# Patient Record
Sex: FEMALE
[De-identification: names, ages, dates, MRNs, and addresses within clinical notes are randomized per-mention and may not be internally consistent; named-entity substitution may affect disease eponyms.]

---

## 2022-11-19 ENCOUNTER — NURSE TRIAGE (OUTPATIENT)
Dept: OTHER | Facility: CLINIC | Age: 62
End: 2022-11-19

## 2022-11-19 NOTE — TELEPHONE ENCOUNTER
Location of patient: [place]    Subjective: Caller states \"I was wondering if I should go to the ER\"     Current Symptoms: Wears glasses for distance and can read fine without glasses. Noticed last night that her L eye is blurry now with and without her glasses. Does have headache today. High BP. BP at time of call is 160/95. Takes BP medication as prescribed. Last dose was 30-60 minutes ago. No chest pain or trouble breathing. Onset: 1 day ago; unchanged    Associated Symptoms: reduced activity    Pain Severity: 3-4/10; dull, aching; constant, moderate    Temperature: denies fever     LMP: NA Pregnant: NA    Recommended disposition: Go to ED Now    Care advice provided, patient verbalizes understanding; denies any other questions or concerns; instructed to call back for any new or worsening symptoms. Patient/caller agrees to proceed to [facility] Emergency Department    This triage is a result of a call to [address] [place] [address]. Please do not respond to the triage nurse through this encounter. Any subsequent communication should be directly with the patient.     Reason for Disposition   Systolic BP >= 203 OR Diastolic >= 467, and any cardiac or neurologic symptoms (e.g., chest pain, difficulty breathing, unsteady gait, blurred vision)    Protocols used: Blood Pressure - High-ADULT-[place]

## 2023-06-06 ENCOUNTER — TELEPHONE (OUTPATIENT)
Dept: PRIMARY CARE | Facility: CLINIC | Age: 63
End: 2023-06-06

## 2023-06-06 DIAGNOSIS — J45.40 MODERATE PERSISTENT ASTHMA WITHOUT COMPLICATION (HHS-HCC): Primary | ICD-10-CM

## 2023-06-06 RX ORDER — ROSUVASTATIN CALCIUM 20 MG/1
20 TABLET, COATED ORAL DAILY
COMMUNITY
End: 2023-06-27 | Stop reason: SDUPTHER

## 2023-06-06 RX ORDER — ALBUTEROL SULFATE 90 UG/1
AEROSOL, METERED RESPIRATORY (INHALATION) EVERY 6 HOURS PRN
COMMUNITY
Start: 2013-05-15 | End: 2023-06-06 | Stop reason: SDUPTHER

## 2023-06-06 RX ORDER — METOPROLOL TARTRATE AND HYDROCHLOROTHIAZIDE 100; 25 MG/1; MG/1
1 TABLET ORAL DAILY
COMMUNITY
End: 2023-06-27 | Stop reason: SDUPTHER

## 2023-06-06 RX ORDER — ALBUTEROL SULFATE 90 UG/1
2 AEROSOL, METERED RESPIRATORY (INHALATION) EVERY 6 HOURS PRN
Qty: 18 G | Refills: 1 | Status: SHIPPED | OUTPATIENT
Start: 2023-06-06

## 2023-06-06 NOTE — TELEPHONE ENCOUNTER
Pt called in for refills on Albuterol inhaler, Metoprolol-hydrochlorothiazide, and rosuvastatin. Pt has not had an apt since 1/2023. Apt was scheduled today for 6/20/23. Are you able to send in 1 month supply until she is able to come in? Please advise.

## 2023-06-20 ENCOUNTER — OFFICE VISIT (OUTPATIENT)
Dept: PRIMARY CARE | Facility: CLINIC | Age: 63
End: 2023-06-20
Payer: COMMERCIAL

## 2023-06-20 VITALS
WEIGHT: 281 LBS | OXYGEN SATURATION: 97 % | TEMPERATURE: 97.2 F | HEART RATE: 58 BPM | BODY MASS INDEX: 41.62 KG/M2 | HEIGHT: 69 IN | DIASTOLIC BLOOD PRESSURE: 79 MMHG | SYSTOLIC BLOOD PRESSURE: 124 MMHG

## 2023-06-20 DIAGNOSIS — E66.01 CLASS 3 SEVERE OBESITY WITH SERIOUS COMORBIDITY AND BODY MASS INDEX (BMI) OF 40.0 TO 44.9 IN ADULT, UNSPECIFIED OBESITY TYPE (MULTI): Primary | ICD-10-CM

## 2023-06-20 DIAGNOSIS — E03.9 ACQUIRED HYPOTHYROIDISM: ICD-10-CM

## 2023-06-20 DIAGNOSIS — J45.20 MILD INTERMITTENT ASTHMA WITHOUT COMPLICATION (HHS-HCC): ICD-10-CM

## 2023-06-20 DIAGNOSIS — E11.9 TYPE 2 DIABETES MELLITUS WITHOUT COMPLICATION, WITHOUT LONG-TERM CURRENT USE OF INSULIN (MULTI): Primary | ICD-10-CM

## 2023-06-20 DIAGNOSIS — I10 BENIGN ESSENTIAL HYPERTENSION: ICD-10-CM

## 2023-06-20 DIAGNOSIS — E78.2 HYPERLIPEMIA, MIXED: ICD-10-CM

## 2023-06-20 DIAGNOSIS — E66.01 CLASS 3 SEVERE OBESITY WITH SERIOUS COMORBIDITY AND BODY MASS INDEX (BMI) OF 40.0 TO 44.9 IN ADULT, UNSPECIFIED OBESITY TYPE (MULTI): ICD-10-CM

## 2023-06-20 PROBLEM — J38.7 IRRITABLE LARYNX SYNDROME: Status: RESOLVED | Noted: 2023-06-20 | Resolved: 2023-06-20

## 2023-06-20 PROBLEM — H52.03 HYPEROPIA WITH PRESBYOPIA OF BOTH EYES: Status: ACTIVE | Noted: 2023-06-20

## 2023-06-20 PROBLEM — J45.30 MILD PERSISTENT ASTHMA WITHOUT COMPLICATION (HHS-HCC): Status: ACTIVE | Noted: 2023-06-06

## 2023-06-20 PROBLEM — J30.9 ALLERGIC RHINITIS: Status: ACTIVE | Noted: 2023-06-20

## 2023-06-20 PROBLEM — H52.4 MYOPIA WITH ASTIGMATISM AND PRESBYOPIA: Status: ACTIVE | Noted: 2023-06-20

## 2023-06-20 PROBLEM — H04.123 DRY EYE SYNDROME OF BOTH LACRIMAL GLANDS: Status: ACTIVE | Noted: 2023-06-20

## 2023-06-20 PROBLEM — J34.2 DEVIATED NASAL SEPTUM: Status: RESOLVED | Noted: 2023-06-20 | Resolved: 2023-06-20

## 2023-06-20 PROBLEM — H52.4 HYPEROPIA WITH PRESBYOPIA OF BOTH EYES: Status: ACTIVE | Noted: 2023-06-20

## 2023-06-20 PROBLEM — K76.0 NAFLD (NONALCOHOLIC FATTY LIVER DISEASE): Status: ACTIVE | Noted: 2023-06-20

## 2023-06-20 PROBLEM — Z96.1 BILATERAL PSEUDOPHAKIA: Status: ACTIVE | Noted: 2023-06-20

## 2023-06-20 PROBLEM — H25.811 COMBINED FORM OF AGE-RELATED CATARACT, RIGHT EYE: Status: ACTIVE | Noted: 2023-06-20

## 2023-06-20 PROBLEM — D14.0 BENIGN NEOPLASM OF NASAL CAVITY: Status: RESOLVED | Noted: 2023-06-20 | Resolved: 2023-06-20

## 2023-06-20 PROBLEM — D14.0 BENIGN NEOPLASM OF NASAL CAVITY: Status: ACTIVE | Noted: 2023-06-20

## 2023-06-20 PROBLEM — E55.9 VITAMIN D DEFICIENCY: Status: ACTIVE | Noted: 2023-06-20

## 2023-06-20 PROBLEM — H52.209 MYOPIA WITH ASTIGMATISM AND PRESBYOPIA: Status: ACTIVE | Noted: 2023-06-20

## 2023-06-20 PROBLEM — H52.10 MYOPIA WITH ASTIGMATISM AND PRESBYOPIA: Status: ACTIVE | Noted: 2023-06-20

## 2023-06-20 PROBLEM — G43.909 MIGRAINE: Status: ACTIVE | Noted: 2023-06-20

## 2023-06-20 PROBLEM — H35.373 EPIRETINAL MEMBRANE (ERM) OF BOTH EYES: Status: ACTIVE | Noted: 2023-06-20

## 2023-06-20 PROBLEM — J38.7 IRRITABLE LARYNX SYNDROME: Status: ACTIVE | Noted: 2023-06-20

## 2023-06-20 PROBLEM — G47.33 OBSTRUCTIVE SLEEP APNEA: Status: ACTIVE | Noted: 2023-06-20

## 2023-06-20 PROBLEM — J34.2 DEVIATED NASAL SEPTUM: Status: ACTIVE | Noted: 2023-06-20

## 2023-06-20 PROBLEM — J32.8 OTHER CHRONIC SINUSITIS: Status: ACTIVE | Noted: 2023-06-20

## 2023-06-20 LAB — POC HEMOGLOBIN A1C: 6.5 % (ref 4.2–6.5)

## 2023-06-20 PROCEDURE — 99214 OFFICE O/P EST MOD 30 MIN: CPT | Performed by: FAMILY MEDICINE

## 2023-06-20 PROCEDURE — 3008F BODY MASS INDEX DOCD: CPT | Performed by: FAMILY MEDICINE

## 2023-06-20 PROCEDURE — 83036 HEMOGLOBIN GLYCOSYLATED A1C: CPT | Performed by: FAMILY MEDICINE

## 2023-06-20 PROCEDURE — 1036F TOBACCO NON-USER: CPT | Performed by: FAMILY MEDICINE

## 2023-06-20 PROCEDURE — 3078F DIAST BP <80 MM HG: CPT | Performed by: FAMILY MEDICINE

## 2023-06-20 PROCEDURE — 3074F SYST BP LT 130 MM HG: CPT | Performed by: FAMILY MEDICINE

## 2023-06-20 RX ORDER — AMLODIPINE AND BENAZEPRIL HYDROCHLORIDE 10; 40 MG/1; MG/1
1 CAPSULE ORAL DAILY
COMMUNITY
End: 2023-06-27 | Stop reason: SDUPTHER

## 2023-06-20 RX ORDER — ARTIFICIAL TEARS 1; 2; 3 MG/ML; MG/ML; MG/ML
SOLUTION/ DROPS OPHTHALMIC 4 TIMES DAILY PRN
COMMUNITY

## 2023-06-20 RX ORDER — THYROID, PORCINE 15 MG/1
TABLET ORAL
COMMUNITY
End: 2023-06-27 | Stop reason: SDUPTHER

## 2023-06-20 RX ORDER — IBUPROFEN 600 MG/1
600 TABLET ORAL EVERY 6 HOURS PRN
COMMUNITY
Start: 2023-03-20

## 2023-06-20 RX ORDER — TIRZEPATIDE 2.5 MG/.5ML
2.5 INJECTION, SOLUTION SUBCUTANEOUS
Qty: 6 ML | Refills: 0 | Status: SHIPPED | OUTPATIENT
Start: 2023-06-20 | End: 2023-08-24

## 2023-06-20 RX ORDER — ASPIRIN 81 MG/1
81 TABLET ORAL
COMMUNITY
Start: 2010-05-25

## 2023-06-20 RX ORDER — SUMATRIPTAN 50 MG/1
TABLET, FILM COATED ORAL
COMMUNITY
Start: 2016-08-09

## 2023-06-20 RX ORDER — ACETAMINOPHEN 500 MG
1 TABLET ORAL DAILY
COMMUNITY
Start: 2018-01-11

## 2023-06-20 ASSESSMENT — PATIENT HEALTH QUESTIONNAIRE - PHQ9
SUM OF ALL RESPONSES TO PHQ9 QUESTIONS 1 AND 2: 0
2. FEELING DOWN, DEPRESSED OR HOPELESS: NOT AT ALL
1. LITTLE INTEREST OR PLEASURE IN DOING THINGS: NOT AT ALL

## 2023-06-20 ASSESSMENT — ENCOUNTER SYMPTOMS
COUGH: 0
FATIGUE: 0
SHORTNESS OF BREATH: 0
UNEXPECTED WEIGHT CHANGE: 0
PALPITATIONS: 0
ABDOMINAL PAIN: 0

## 2023-06-20 NOTE — PROGRESS NOTES
"Subjective   Patient ID: Rebecca Acharya is a 62 y.o. female who presents for Follow-up (On BP and medication ).    Rebecca is here to follow up on her blood pressure and diabetes.  She is due for labs and refills.   No problems with her medication.  She is planning to retire in September.  She hasn't been doing well with her diet and hasn't been exercising regularly.  Her weight has been stable.          Review of Systems   Constitutional:  Negative for fatigue and unexpected weight change.   Respiratory:  Negative for cough and shortness of breath.    Cardiovascular:  Negative for chest pain and palpitations.   Gastrointestinal:  Negative for abdominal pain.       Objective     /79   Pulse 58   Temp 36.2 °C (97.2 °F)   Ht 1.74 m (5' 8.5\")   Wt 127 kg (281 lb)   SpO2 97%   BMI 42.10 kg/m²     Physical Exam  Constitutional:       General: She is not in acute distress.     Appearance: She is obese.   Neck:      Thyroid: No thyromegaly.   Cardiovascular:      Rate and Rhythm: Normal rate and regular rhythm.      Heart sounds: No murmur heard.  Pulmonary:      Effort: No respiratory distress.      Breath sounds: Normal breath sounds.   Lymphadenopathy:      Cervical: No cervical adenopathy.   Neurological:      Mental Status: She is alert.             Assessment/Plan   Problem List Items Addressed This Visit       Mild intermittent asthma without complication    Acquired hypothyroidism    Relevant Orders    TSH with reflex to Free T4 if abnormal    T4, free    T3, free    Benign essential hypertension    Current Assessment & Plan     Blood pressure is controlled.  Labs ordered.         Hyperlipemia, mixed    Relevant Orders    Comprehensive Metabolic Panel    Lipid Panel    Type 2 diabetes mellitus without complication, without long-term current use of insulin (CMS/[facility]) - Primary    Current Assessment & Plan     Blood sugar is controlled with diet alone.  Will see about coverage for Mounjaro to help her with her " weight.           Relevant Medications    tirzepatide (Mounjaro) 2.5 mg/0.5 mL pen injector    Other Relevant Orders    POCT glycosylated hemoglobin (Hb A1C) manually resulted (Completed)    Albumin , Urine Random     Other Visit Diagnoses       Class 3 severe obesity with serious comorbidity and body mass index (BMI) of 40.0 to 44.9 in adult, unspecified obesity type (CMS/HCC)        Relevant Medications    tirzepatide (Mounjaro) 2.5 mg/0.5 mL pen injector

## 2023-06-20 NOTE — ASSESSMENT & PLAN NOTE
Blood sugar is controlled with diet alone.  Will see about coverage for Mounjaro to help her with her weight.

## 2023-06-23 ENCOUNTER — TELEPHONE (OUTPATIENT)
Dept: PRIMARY CARE | Facility: CLINIC | Age: 63
End: 2023-06-23

## 2023-06-23 NOTE — TELEPHONE ENCOUNTER
Rx Refill Request Telephone Encounter    Name:  Rebecca Acharya  :  [id number]  Medication Name:  Metoprolol   Dose : 100-25  Route :    Frequency : 1 a day   Quantity : 90  Directions :    [facility] location:  [facility]  Date of last appointment:  25  Date of next appointment:    Best number to reach patient:  [phone]    Rx Refill Request Telephone Encounter    Name:  Rebecca Acharya  :  [id number]  Medication Name:  Rosuvastatin  Dose : 20 mg   Route :    Frequency : 1 a day  Quantity : 90  Directions :    [facility]  Date of last appointment:  23  Date of next appointment:    Best number to reach patient:

## 2023-06-27 ENCOUNTER — LAB (OUTPATIENT)
Dept: LAB | Facility: LAB | Age: 63
End: 2023-06-27
Payer: COMMERCIAL

## 2023-06-27 DIAGNOSIS — E11.9 TYPE 2 DIABETES MELLITUS WITHOUT COMPLICATION, WITHOUT LONG-TERM CURRENT USE OF INSULIN (MULTI): ICD-10-CM

## 2023-06-27 DIAGNOSIS — E78.2 HYPERLIPEMIA, MIXED: ICD-10-CM

## 2023-06-27 DIAGNOSIS — E03.9 ACQUIRED HYPOTHYROIDISM: ICD-10-CM

## 2023-06-27 LAB
ALANINE AMINOTRANSFERASE (SGPT) (U/L) IN SER/PLAS: 25 U/L (ref 7–45)
ALBUMIN (G/DL) IN SER/PLAS: 4.1 G/DL (ref 3.4–5)
ALBUMIN (MG/L) IN URINE: <7 MG/L
ALBUMIN/CREATININE (UG/MG) IN URINE: NORMAL UG/MG CRT (ref 0–30)
ALKALINE PHOSPHATASE (U/L) IN SER/PLAS: 93 U/L (ref 33–136)
ANION GAP IN SER/PLAS: 10 MMOL/L (ref 10–20)
ASPARTATE AMINOTRANSFERASE (SGOT) (U/L) IN SER/PLAS: 22 U/L (ref 9–39)
BILIRUBIN TOTAL (MG/DL) IN SER/PLAS: 0.4 MG/DL (ref 0–1.2)
CALCIUM (MG/DL) IN SER/PLAS: 9.4 MG/DL (ref 8.6–10.3)
CARBON DIOXIDE, TOTAL (MMOL/L) IN SER/PLAS: 32 MMOL/L (ref 21–32)
CHLORIDE (MMOL/L) IN SER/PLAS: 101 MMOL/L (ref 98–107)
CHOLESTEROL (MG/DL) IN SER/PLAS: 135 MG/DL (ref 0–199)
CHOLESTEROL IN HDL (MG/DL) IN SER/PLAS: 64.7 MG/DL
CHOLESTEROL/HDL RATIO: 2.1
CREATININE (MG/DL) IN SER/PLAS: 0.83 MG/DL (ref 0.5–1.05)
CREATININE (MG/DL) IN URINE: 100 MG/DL (ref 20–320)
GFR FEMALE: 78 ML/MIN/1.73M2
GLUCOSE (MG/DL) IN SER/PLAS: 104 MG/DL (ref 74–99)
LDL: 60 MG/DL (ref 0–99)
POTASSIUM (MMOL/L) IN SER/PLAS: 4.2 MMOL/L (ref 3.5–5.3)
PROTEIN TOTAL: 7.1 G/DL (ref 6.4–8.2)
SODIUM (MMOL/L) IN SER/PLAS: 139 MMOL/L (ref 136–145)
THYROTROPIN (MIU/L) IN SER/PLAS BY DETECTION LIMIT <= 0.05 MIU/L: 1.73 MIU/L (ref 0.44–3.98)
THYROXINE (T4) FREE (NG/DL) IN SER/PLAS: 0.69 NG/DL (ref 0.61–1.12)
TRIGLYCERIDE (MG/DL) IN SER/PLAS: 52 MG/DL (ref 0–149)
TRIIODOTHYRONINE (T3) FREE (PG/ML) IN SER/PLAS: 2.9 PG/ML (ref 2.3–4.2)
UREA NITROGEN (MG/DL) IN SER/PLAS: 13 MG/DL (ref 6–23)
VLDL: 10 MG/DL (ref 0–40)

## 2023-06-27 PROCEDURE — 36415 COLL VENOUS BLD VENIPUNCTURE: CPT

## 2023-06-27 PROCEDURE — 84443 ASSAY THYROID STIM HORMONE: CPT

## 2023-06-27 PROCEDURE — 80061 LIPID PANEL: CPT

## 2023-06-27 PROCEDURE — 82570 ASSAY OF URINE CREATININE: CPT

## 2023-06-27 PROCEDURE — 82043 UR ALBUMIN QUANTITATIVE: CPT

## 2023-06-27 PROCEDURE — 80053 COMPREHEN METABOLIC PANEL: CPT

## 2023-06-27 PROCEDURE — 84439 ASSAY OF FREE THYROXINE: CPT

## 2023-06-27 PROCEDURE — 84481 FREE ASSAY (FT-3): CPT

## 2023-06-27 RX ORDER — AMLODIPINE AND BENAZEPRIL HYDROCHLORIDE 10; 40 MG/1; MG/1
1 CAPSULE ORAL DAILY
Qty: 90 CAPSULE | Refills: 1 | Status: SHIPPED | OUTPATIENT
Start: 2023-06-27 | End: 2023-10-03 | Stop reason: SDUPTHER

## 2023-06-27 RX ORDER — METOPROLOL TARTRATE AND HYDROCHLOROTHIAZIDE 100; 25 MG/1; MG/1
1 TABLET ORAL DAILY
Qty: 90 TABLET | Refills: 1 | Status: SHIPPED | OUTPATIENT
Start: 2023-06-27 | End: 2023-10-03 | Stop reason: SDUPTHER

## 2023-06-27 RX ORDER — THYROID, PORCINE 15 MG/1
TABLET ORAL
Qty: 270 TABLET | Refills: 1 | Status: SHIPPED | OUTPATIENT
Start: 2023-06-27 | End: 2023-10-03 | Stop reason: SDUPTHER

## 2023-06-27 RX ORDER — ROSUVASTATIN CALCIUM 20 MG/1
20 TABLET, COATED ORAL DAILY
Qty: 90 TABLET | Refills: 1 | Status: SHIPPED | OUTPATIENT
Start: 2023-06-27 | End: 2023-10-03 | Stop reason: SDUPTHER

## 2023-06-28 NOTE — TELEPHONE ENCOUNTER
----- Message from Marleen Burton MD sent at 6/27/2023  5:59 PM EDT -----  Labs reviewed, everything looks great.  Refills sent to retail.

## 2023-08-17 ENCOUNTER — APPOINTMENT (OUTPATIENT)
Dept: PRIMARY CARE | Facility: CLINIC | Age: 63
End: 2023-08-17
Payer: MEDICARE

## 2023-08-24 ENCOUNTER — OFFICE VISIT (OUTPATIENT)
Dept: PRIMARY CARE | Facility: CLINIC | Age: 63
End: 2023-08-24
Payer: MEDICARE

## 2023-08-24 VITALS
TEMPERATURE: 97 F | HEIGHT: 69 IN | BODY MASS INDEX: 40.43 KG/M2 | SYSTOLIC BLOOD PRESSURE: 121 MMHG | DIASTOLIC BLOOD PRESSURE: 72 MMHG | WEIGHT: 273 LBS | HEART RATE: 56 BPM | OXYGEN SATURATION: 97 %

## 2023-08-24 DIAGNOSIS — E11.9 TYPE 2 DIABETES MELLITUS WITHOUT COMPLICATION, WITHOUT LONG-TERM CURRENT USE OF INSULIN (MULTI): ICD-10-CM

## 2023-08-24 DIAGNOSIS — E66.01 CLASS 3 SEVERE OBESITY WITH SERIOUS COMORBIDITY AND BODY MASS INDEX (BMI) OF 40.0 TO 44.9 IN ADULT, UNSPECIFIED OBESITY TYPE (MULTI): ICD-10-CM

## 2023-08-24 DIAGNOSIS — K76.0 NAFLD (NONALCOHOLIC FATTY LIVER DISEASE): ICD-10-CM

## 2023-08-24 DIAGNOSIS — I10 BENIGN ESSENTIAL HYPERTENSION: Primary | ICD-10-CM

## 2023-08-24 PROBLEM — E66.813 CLASS 3 SEVERE OBESITY WITH SERIOUS COMORBIDITY AND BODY MASS INDEX (BMI) OF 40.0 TO 44.9 IN ADULT: Status: ACTIVE | Noted: 2023-08-24

## 2023-08-24 PROCEDURE — 1170F FXNL STATUS ASSESSED: CPT | Performed by: FAMILY MEDICINE

## 2023-08-24 PROCEDURE — 1036F TOBACCO NON-USER: CPT | Performed by: FAMILY MEDICINE

## 2023-08-24 PROCEDURE — 1160F RVW MEDS BY RX/DR IN RCRD: CPT | Performed by: FAMILY MEDICINE

## 2023-08-24 PROCEDURE — 1159F MED LIST DOCD IN RCRD: CPT | Performed by: FAMILY MEDICINE

## 2023-08-24 PROCEDURE — 3008F BODY MASS INDEX DOCD: CPT | Performed by: FAMILY MEDICINE

## 2023-08-24 PROCEDURE — 3074F SYST BP LT 130 MM HG: CPT | Performed by: FAMILY MEDICINE

## 2023-08-24 PROCEDURE — 3078F DIAST BP <80 MM HG: CPT | Performed by: FAMILY MEDICINE

## 2023-08-24 PROCEDURE — 99214 OFFICE O/P EST MOD 30 MIN: CPT | Performed by: FAMILY MEDICINE

## 2023-08-24 ASSESSMENT — ACTIVITIES OF DAILY LIVING (ADL)
MANAGING_FINANCES: INDEPENDENT
DRESSING: INDEPENDENT
GROCERY_SHOPPING: INDEPENDENT
BATHING: INDEPENDENT
TAKING_MEDICATION: INDEPENDENT
DOING_HOUSEWORK: INDEPENDENT

## 2023-08-24 ASSESSMENT — ENCOUNTER SYMPTOMS
ABDOMINAL PAIN: 0
PALPITATIONS: 0
SHORTNESS OF BREATH: 0
UNEXPECTED WEIGHT CHANGE: 0
FATIGUE: 0
COUGH: 0

## 2023-08-24 ASSESSMENT — VISUAL ACUITY
OS_CC: 20/30
OD_CC: 20/40

## 2023-08-24 NOTE — PROGRESS NOTES
"Subjective   Patient ID: Rebecca Acharya is a 63 y.o. female who presents for Welcome To Medicare.    Rebecca is here to follow up on her diabetes and weight.  She was anxious about the possible side effects with the Mounjaro so she did not start it.  She has been working hard on her diet and has lost 8 pounds since her last visit.          Review of Systems   Constitutional:  Negative for fatigue and unexpected weight change.   Respiratory:  Negative for cough and shortness of breath.    Cardiovascular:  Negative for chest pain and palpitations.   Gastrointestinal:  Negative for abdominal pain.       Objective     /72 Comment: home reading  Pulse 56   Temp 36.1 °C (97 °F)   Ht 1.74 m (5' 8.5\")   Wt 124 kg (273 lb)   SpO2 97%   BMI 40.91 kg/m²     Physical Exam  Constitutional:       General: She is not in acute distress.     Appearance: She is obese.   Neck:      Thyroid: No thyromegaly.   Cardiovascular:      Rate and Rhythm: Normal rate and regular rhythm.      Heart sounds: No murmur heard.  Pulmonary:      Effort: No respiratory distress.      Breath sounds: Normal breath sounds.   Lymphadenopathy:      Cervical: No cervical adenopathy.   Neurological:      Mental Status: She is alert.             Assessment/Plan   Problem List Items Addressed This Visit       Benign essential hypertension - Primary    NAFLD (nonalcoholic fatty liver disease)    Overview     Fibroscan F0-F1 (no advanced fibrosis), CAP stage 1 (of 3) steatosis, done 6/2022         Relevant Medications    hepatitis A (Havrix) 720 DARYN unit/0.5 mL syringe    Type 2 diabetes mellitus without complication, without long-term current use of insulin (CMS/HCC)    Class 3 severe obesity with serious comorbidity and body mass index (BMI) of 40.0 to 44.9 in adult (CMS/HCC)    Current Assessment & Plan     She is doing so well with lifestyle change and weight loss, will continue and F/U 1 month for her Welcome to Medicare visit.                "

## 2023-08-24 NOTE — ASSESSMENT & PLAN NOTE
She is doing so well with lifestyle change and weight loss, will continue and F/U 1 month for her Welcome to Medicare visit.

## 2023-09-21 ENCOUNTER — APPOINTMENT (OUTPATIENT)
Dept: PRIMARY CARE | Facility: CLINIC | Age: 63
End: 2023-09-21
Payer: MEDICARE

## 2023-09-28 ENCOUNTER — APPOINTMENT (OUTPATIENT)
Dept: PRIMARY CARE | Facility: CLINIC | Age: 63
End: 2023-09-28
Payer: MEDICARE

## 2023-10-03 ENCOUNTER — OFFICE VISIT (OUTPATIENT)
Dept: PRIMARY CARE | Facility: CLINIC | Age: 63
End: 2023-10-03
Payer: MEDICARE

## 2023-10-03 VITALS
BODY MASS INDEX: 40.43 KG/M2 | HEART RATE: 64 BPM | DIASTOLIC BLOOD PRESSURE: 70 MMHG | TEMPERATURE: 97.3 F | OXYGEN SATURATION: 96 % | WEIGHT: 273 LBS | HEIGHT: 69 IN | SYSTOLIC BLOOD PRESSURE: 135 MMHG

## 2023-10-03 DIAGNOSIS — E03.9 ACQUIRED HYPOTHYROIDISM: ICD-10-CM

## 2023-10-03 DIAGNOSIS — Z00.00 ROUTINE GENERAL MEDICAL EXAMINATION AT HEALTH CARE FACILITY: Primary | ICD-10-CM

## 2023-10-03 DIAGNOSIS — E78.2 HYPERLIPEMIA, MIXED: ICD-10-CM

## 2023-10-03 DIAGNOSIS — I10 BENIGN ESSENTIAL HYPERTENSION: ICD-10-CM

## 2023-10-03 DIAGNOSIS — Z23 NEED FOR VACCINATION: ICD-10-CM

## 2023-10-03 DIAGNOSIS — Z13.6 SCREENING FOR CARDIOVASCULAR CONDITION: ICD-10-CM

## 2023-10-03 DIAGNOSIS — Z12.31 ENCOUNTER FOR SCREENING MAMMOGRAM FOR BREAST CANCER: ICD-10-CM

## 2023-10-03 PROCEDURE — G0403 EKG FOR INITIAL PREVENT EXAM: HCPCS | Performed by: FAMILY MEDICINE

## 2023-10-03 PROCEDURE — 1170F FXNL STATUS ASSESSED: CPT | Performed by: FAMILY MEDICINE

## 2023-10-03 PROCEDURE — 90677 PCV20 VACCINE IM: CPT | Performed by: FAMILY MEDICINE

## 2023-10-03 PROCEDURE — G0009 ADMIN PNEUMOCOCCAL VACCINE: HCPCS | Performed by: FAMILY MEDICINE

## 2023-10-03 PROCEDURE — 99397 PER PM REEVAL EST PAT 65+ YR: CPT | Performed by: FAMILY MEDICINE

## 2023-10-03 PROCEDURE — 3008F BODY MASS INDEX DOCD: CPT | Performed by: FAMILY MEDICINE

## 2023-10-03 PROCEDURE — 1159F MED LIST DOCD IN RCRD: CPT | Performed by: FAMILY MEDICINE

## 2023-10-03 PROCEDURE — 3078F DIAST BP <80 MM HG: CPT | Performed by: FAMILY MEDICINE

## 2023-10-03 PROCEDURE — 1160F RVW MEDS BY RX/DR IN RCRD: CPT | Performed by: FAMILY MEDICINE

## 2023-10-03 PROCEDURE — 1036F TOBACCO NON-USER: CPT | Performed by: FAMILY MEDICINE

## 2023-10-03 PROCEDURE — 3075F SYST BP GE 130 - 139MM HG: CPT | Performed by: FAMILY MEDICINE

## 2023-10-03 PROCEDURE — G0402 INITIAL PREVENTIVE EXAM: HCPCS | Performed by: FAMILY MEDICINE

## 2023-10-03 RX ORDER — THYROID, PORCINE 15 MG/1
TABLET ORAL
Qty: 270 TABLET | Refills: 0 | Status: SHIPPED | OUTPATIENT
Start: 2023-10-03 | End: 2024-01-10 | Stop reason: SDUPTHER

## 2023-10-03 RX ORDER — AMLODIPINE AND BENAZEPRIL HYDROCHLORIDE 10; 40 MG/1; MG/1
1 CAPSULE ORAL DAILY
Qty: 90 CAPSULE | Refills: 0 | Status: SHIPPED | OUTPATIENT
Start: 2023-10-03 | End: 2024-01-10 | Stop reason: SDUPTHER

## 2023-10-03 RX ORDER — ROSUVASTATIN CALCIUM 20 MG/1
20 TABLET, COATED ORAL DAILY
Qty: 90 TABLET | Refills: 0 | Status: SHIPPED | OUTPATIENT
Start: 2023-10-03 | End: 2023-10-09 | Stop reason: SDUPTHER

## 2023-10-03 RX ORDER — METOPROLOL TARTRATE AND HYDROCHLOROTHIAZIDE 100; 25 MG/1; MG/1
1 TABLET ORAL DAILY
Qty: 90 TABLET | Refills: 0 | Status: SHIPPED | OUTPATIENT
Start: 2023-10-03 | End: 2023-10-09 | Stop reason: SDUPTHER

## 2023-10-03 ASSESSMENT — ACTIVITIES OF DAILY LIVING (ADL)
DOING_HOUSEWORK: INDEPENDENT
BATHING: INDEPENDENT
TAKING_MEDICATION: INDEPENDENT
MANAGING_FINANCES: INDEPENDENT
GROCERY_SHOPPING: INDEPENDENT
DRESSING: INDEPENDENT

## 2023-10-03 ASSESSMENT — ENCOUNTER SYMPTOMS
SHORTNESS OF BREATH: 0
ARTHRALGIAS: 0
DYSURIA: 0
NERVOUS/ANXIOUS: 0
FREQUENCY: 0
NAUSEA: 0
NUMBNESS: 1
BACK PAIN: 1
SORE THROAT: 0
EYE PAIN: 0
RHINORRHEA: 0
WEAKNESS: 0
VOMITING: 0
DIARRHEA: 0
CONSTIPATION: 1
DYSPHORIC MOOD: 0
DIZZINESS: 0
UNEXPECTED WEIGHT CHANGE: 0
PALPITATIONS: 0
ABDOMINAL PAIN: 0
COUGH: 0
HEADACHES: 0
MYALGIAS: 0
FATIGUE: 0
BLOOD IN STOOL: 0

## 2023-10-03 ASSESSMENT — COLUMBIA-SUICIDE SEVERITY RATING SCALE - C-SSRS
2. HAVE YOU ACTUALLY HAD ANY THOUGHTS OF KILLING YOURSELF?: NO
6. HAVE YOU EVER DONE ANYTHING, STARTED TO DO ANYTHING, OR PREPARED TO DO ANYTHING TO END YOUR LIFE?: NO
1. IN THE PAST MONTH, HAVE YOU WISHED YOU WERE DEAD OR WISHED YOU COULD GO TO SLEEP AND NOT WAKE UP?: NO

## 2023-10-03 ASSESSMENT — PATIENT HEALTH QUESTIONNAIRE - PHQ9
1. LITTLE INTEREST OR PLEASURE IN DOING THINGS: NOT AT ALL
SUM OF ALL RESPONSES TO PHQ9 QUESTIONS 1 AND 2: 0
2. FEELING DOWN, DEPRESSED OR HOPELESS: NOT AT ALL

## 2023-10-03 NOTE — PATIENT INSTRUCTIONS
"Thank you for coming in to see me today, and congratulations on paying attention to staying healthy!    The single most important factor in staying healthy is eating a healthy diet.  Research has shown that the healthiest diet for humans is one rich in whole fresh plant foods.  This means most of what you eat should be fresh fruits and vegetables, whole grains, beans, nuts and seeds.  Adding meat, dairy foods and eggs does not make your food healthier (although it may make it taste better!) so you should work to minimize the amount of beef, chicken, pork, turkey, lamb, cheese and eggs you eat.  Fatty fish like salmon and tuna may be healthier alternatives.  If you would like more information please check out the website \"[employer],\" and the books \"The Blue Zones\" by Valentin Skinner and \"Engine 2 Diet\" by Edwin Hernandez.    I don't like recommending \"exercise\" because that has unpleasant connotations of pain and being out of breath for many people.  Instead, I encourage my patients to find a way to move your body that you LOVE and would want to do even if it were bad for you!  Playing a fun sport like pickleball, doing yoga or farooq chi, studying martial arts, learning to dance, even chasing your kids or grandkids around the backyard are great examples of activity that makes your heart healthier.  Remember, if you don't like it, don't do it!  There are plenty of fun options, pick one and try it out!  Aim for at least 30 minutes of activity that gets your heart revved up, most days per week.    We discussed some screening tests for you today, these tests are meant to find problems before they make you sick, when they are easier to treat and less likely to impact your health.  Depending on your age and stage of life they may include blood tests, a Pap test, a mammogram, a bone density test, a colonoscopy and others.  If you have questions later about these or other recommended tests please call and ask!    There are " a number of other things you can do to improve your health.  These may include things like   Increasing the amount of water you drink every day (aim for 1 oz of water for every 2 pounds of body weight, up to about 100 oz total)  Getting 7-8 hours of sleep every night  Avoiding smoking, drinking alcohol, and using recreational drugs    Stress management is also a very important component of staying healthy.  One of the most effective stress management tools is meditation.  I recommend everyone consider proper training in meditation - it isn't just sitting with your eyes closed and breathing.  You can find a training program in your area at [employer] or [employer].org.    An annual physical is a great measure to keep you as healthy as you possibly can be.  Good for you for getting that done!  See you next year :-)

## 2023-10-03 NOTE — PROGRESS NOTES
Subjective   Reason for Visit: Rebecca Acharya is an 63 y.o. female here for a Medicare Wellness visit.     Past Medical, Surgical, and Family History reviewed and updated in chart.    Reviewed all medications by prescribing practitioner or clinical pharmacist (such as prescriptions, OTCs, herbal therapies and supplements) and documented in the medical record.    Rebecca is here for her Welcome to Medicare visit.    Diet:  Has been off track, doing better  Exercise:  Bought a treadmill  Sleep:  Not good, has always had problems  Stress:  Moderate  Smoking:  Never  EtOH:  None    Profession:  Retail    Dentist: Sees regularly  Eye doctor:  Sees annually    Last Pap:  N/A (hysterectomy)  History of abnormal Pap:  No  Mammogram:  2022  Colorectal cancer screening:  Colonoscopy 10/27/2022, repeat 5 years (history of polyps)  DEXA scan:  2019, normal, repeat 5 years  Vaccines due?  Needs Prevnar-20, had flu and COVID today.    Sexually active:  No    Menopause symptoms:  No hot flushes or night sweats  Urinary leakage: No        Patient Care Team:  Marleen Burton MD as PCP - General (Family Medicine)  Marleen Burton MD as PCP - [facility] ACO PCP     Review of Systems   Constitutional:  Negative for fatigue and unexpected weight change.   HENT:  Negative for congestion, ear pain, rhinorrhea and sore throat.    Eyes:  Negative for pain and visual disturbance.   Respiratory:  Negative for cough and shortness of breath.    Cardiovascular:  Negative for chest pain and palpitations.   Gastrointestinal:  Positive for constipation (off and on). Negative for abdominal pain, blood in stool, diarrhea, nausea and vomiting.   Genitourinary:  Negative for dysuria, frequency, vaginal bleeding and vaginal discharge.   Musculoskeletal:  Positive for back pain. Negative for arthralgias and myalgias.   Skin:  Negative for rash.   Neurological:  Positive for numbness (toes). Negative for dizziness, weakness and headaches.  "  Psychiatric/Behavioral:  Negative for dysphoric mood. The patient is not nervous/anxious.        Objective   Vitals:  /70   Pulse 64   Temp 36.3 °C (97.3 °F)   Ht 1.74 m (5' 8.5\")   Wt 124 kg (273 lb)   SpO2 96%   BMI 40.91 kg/m²       Physical Exam  Constitutional:       General: She is not in acute distress.     Appearance: She is obese.   HENT:      Right Ear: Tympanic membrane normal.      Left Ear: Tympanic membrane normal.   Neck:      Thyroid: No thyromegaly.   Cardiovascular:      Rate and Rhythm: Normal rate and regular rhythm.      Heart sounds: No murmur heard.  Pulmonary:      Effort: No respiratory distress.      Breath sounds: Normal breath sounds.   Chest:   Breasts:     Breasts are symmetrical.      Right: No mass, nipple discharge, skin change or tenderness.      Left: No mass, nipple discharge, skin change or tenderness.   Abdominal:      General: Bowel sounds are normal. There is no distension.      Palpations: Abdomen is soft. There is no hepatomegaly or splenomegaly.      Tenderness: There is no abdominal tenderness.   Musculoskeletal:         General: No swelling or tenderness.   Lymphadenopathy:      Cervical: No cervical adenopathy.      Upper Body:      Right upper body: No axillary adenopathy.      Left upper body: No axillary adenopathy.   Skin:     General: Skin is warm and dry.      Coloration: Skin is not jaundiced.      Findings: No rash.   Neurological:      General: No focal deficit present.      Mental Status: She is alert and oriented to person, place, and time.   Psychiatric:         Mood and Affect: Mood normal.         Behavior: Behavior normal.         Assessment/Plan   Problem List Items Addressed This Visit       Acquired hypothyroidism    Relevant Medications    [place] Thyroid 15 mg tablet    Benign essential hypertension    Relevant Medications    metoprolol ta-hydrochlorothiaz (Lopressor HCT) 100-25 mg tablet    amLODIPine-benazepriL (Lotrel) 10-40 mg capsule "    Hyperlipemia, mixed    Relevant Medications    rosuvastatin (Crestor) 20 mg tablet     Other Visit Diagnoses       Routine general medical examination at health care facility    -  Primary    Screening for cardiovascular condition        Relevant Orders    ECG 12 lead (Completed)    Need for vaccination        Relevant Medications    zoster vaccine-recombinant adjuvanted (Shingrix) 50 mcg/0.5 mL vaccine    Other Relevant Orders    Pneumococcal conjugate vaccine 20-valent IM (Completed)    Encounter for screening mammogram for breast cancer        Relevant Orders    BI mammo bilateral screening tomosynthesis

## 2023-10-09 ENCOUNTER — TELEPHONE (OUTPATIENT)
Dept: PRIMARY CARE | Facility: CLINIC | Age: 63
End: 2023-10-09
Payer: MEDICARE

## 2023-10-09 DIAGNOSIS — I10 BENIGN ESSENTIAL HYPERTENSION: ICD-10-CM

## 2023-10-09 DIAGNOSIS — E78.2 HYPERLIPEMIA, MIXED: ICD-10-CM

## 2023-10-09 RX ORDER — ROSUVASTATIN CALCIUM 20 MG/1
20 TABLET, COATED ORAL DAILY
Qty: 30 TABLET | Refills: 0 | Status: SHIPPED | OUTPATIENT
Start: 2023-10-09 | End: 2024-01-10 | Stop reason: SDUPTHER

## 2023-10-09 RX ORDER — METOPROLOL TARTRATE AND HYDROCHLOROTHIAZIDE 100; 25 MG/1; MG/1
1 TABLET ORAL DAILY
Qty: 30 TABLET | Refills: 0 | Status: SHIPPED | OUTPATIENT
Start: 2023-10-09 | End: 2024-01-10 | Stop reason: SDUPTHER

## 2023-10-09 NOTE — TELEPHONE ENCOUNTER
Patient is out of medication, but is not going to get her Rosuvastatin 20 MG and Metoprolol Ta-Hydrochlorothiaz 100-25 MG from the mail pharmacy for another week. She was hoping for just a week supply of them to be sent to [facility] to cover her.

## 2023-11-14 ENCOUNTER — TELEPHONE (OUTPATIENT)
Dept: PRIMARY CARE | Facility: CLINIC | Age: 63
End: 2023-11-14
Payer: MEDICARE

## 2023-11-14 NOTE — TELEPHONE ENCOUNTER
Patient called because she has been tripping and falling a lot in the last 2 months. She said she has always been clumsy but recently she has been tripping/ falling at least once a day. She isn't getting dizzy she's just tripping. She said it might be because she has numbness in her toes and feet but she doesn't know. She want to know if she should come see you or if you recommend she see a neurologist. Please advise.

## 2023-11-15 ENCOUNTER — OFFICE VISIT (OUTPATIENT)
Dept: PRIMARY CARE | Facility: CLINIC | Age: 63
End: 2023-11-15
Payer: MEDICARE

## 2023-11-15 VITALS
DIASTOLIC BLOOD PRESSURE: 80 MMHG | SYSTOLIC BLOOD PRESSURE: 128 MMHG | TEMPERATURE: 97.3 F | OXYGEN SATURATION: 95 % | WEIGHT: 277 LBS | HEART RATE: 54 BPM | BODY MASS INDEX: 41.03 KG/M2 | HEIGHT: 69 IN

## 2023-11-15 DIAGNOSIS — E11.42 TYPE 2 DIABETES MELLITUS WITH DIABETIC POLYNEUROPATHY, WITHOUT LONG-TERM CURRENT USE OF INSULIN (MULTI): ICD-10-CM

## 2023-11-15 DIAGNOSIS — R26.9 GAIT DISTURBANCE: Primary | ICD-10-CM

## 2023-11-15 PROCEDURE — 3079F DIAST BP 80-89 MM HG: CPT | Performed by: FAMILY MEDICINE

## 2023-11-15 PROCEDURE — 3074F SYST BP LT 130 MM HG: CPT | Performed by: FAMILY MEDICINE

## 2023-11-15 PROCEDURE — 1159F MED LIST DOCD IN RCRD: CPT | Performed by: FAMILY MEDICINE

## 2023-11-15 PROCEDURE — 1036F TOBACCO NON-USER: CPT | Performed by: FAMILY MEDICINE

## 2023-11-15 PROCEDURE — 3008F BODY MASS INDEX DOCD: CPT | Performed by: FAMILY MEDICINE

## 2023-11-15 PROCEDURE — 1160F RVW MEDS BY RX/DR IN RCRD: CPT | Performed by: FAMILY MEDICINE

## 2023-11-15 PROCEDURE — 99214 OFFICE O/P EST MOD 30 MIN: CPT | Performed by: FAMILY MEDICINE

## 2023-11-15 ASSESSMENT — ENCOUNTER SYMPTOMS
NUMBNESS: 1
ABDOMINAL PAIN: 0
LIGHT-HEADEDNESS: 0
DIZZINESS: 0
PALPITATIONS: 0
SHORTNESS OF BREATH: 0
FATIGUE: 0
COUGH: 0
UNEXPECTED WEIGHT CHANGE: 0

## 2023-11-15 ASSESSMENT — PATIENT HEALTH QUESTIONNAIRE - PHQ9
SUM OF ALL RESPONSES TO PHQ9 QUESTIONS 1 & 2: 0
2. FEELING DOWN, DEPRESSED OR HOPELESS: NOT AT ALL
1. LITTLE INTEREST OR PLEASURE IN DOING THINGS: NOT AT ALL

## 2023-11-15 ASSESSMENT — COLUMBIA-SUICIDE SEVERITY RATING SCALE - C-SSRS
1. IN THE PAST MONTH, HAVE YOU WISHED YOU WERE DEAD OR WISHED YOU COULD GO TO SLEEP AND NOT WAKE UP?: NO
2. HAVE YOU ACTUALLY HAD ANY THOUGHTS OF KILLING YOURSELF?: NO
6. HAVE YOU EVER DONE ANYTHING, STARTED TO DO ANYTHING, OR PREPARED TO DO ANYTHING TO END YOUR LIFE?: NO

## 2023-11-15 ASSESSMENT — LIFESTYLE VARIABLES: HOW OFTEN DO YOU HAVE A DRINK CONTAINING ALCOHOL: NEVER

## 2023-11-15 NOTE — PROGRESS NOTES
"Subjective   Patient ID: Rebecca Acharya is a 63 y.o. female who presents for balance (The patient has been tripping several times a week sometimes several times in a day x off and on 2 months ).    Rebecca is here because she has been tripping and falling frequently.  She first noticed it about 2 months ago.  She is not losing her balance, she does not feel dizzy.  No lightheadedness.  She does have numbness in her feet.        Review of Systems   Constitutional:  Negative for fatigue and unexpected weight change.   Respiratory:  Negative for cough and shortness of breath.    Cardiovascular:  Negative for chest pain and palpitations.   Gastrointestinal:  Negative for abdominal pain.   Neurological:  Positive for numbness. Negative for dizziness and light-headedness.       Objective     /80   Pulse 54   Temp 36.3 °C (97.3 °F)   Ht 1.74 m (5' 8.5\")   Wt 126 kg (277 lb)   SpO2 95%   BMI 41.51 kg/m²     Physical Exam  Constitutional:       General: She is not in acute distress.  Neck:      Thyroid: No thyromegaly.   Cardiovascular:      Rate and Rhythm: Normal rate and regular rhythm.      Heart sounds: No murmur heard.  Pulmonary:      Effort: No respiratory distress.      Breath sounds: Normal breath sounds.   Lymphadenopathy:      Cervical: No cervical adenopathy.   Neurological:      Mental Status: She is alert.      Comments: Gait is grossly normal.  She has some difficulty with heel walking more than toe walking.  Romberg is negative.             Assessment/Plan   Problem List Items Addressed This Visit       Type 2 diabetes mellitus with diabetic polyneuropathy, without long-term current use of insulin (CMS/[facility])    Relevant Orders    Referral to Podiatry    Gait disturbance - Primary    Current Assessment & Plan     It looks like she may have a little foot drop, which may be related to neuropathy.  Will refer to PT for strengthening and gait assessment, and to OT for evaluation for possible AFOs to reduce " the risk of tripping and falling.           Relevant Orders    Referral to Physical Therapy    Referral to Occupational Therapy

## 2023-11-15 NOTE — ASSESSMENT & PLAN NOTE
It looks like she may have a little foot drop, which may be related to neuropathy.  Will refer to PT for strengthening and gait assessment, and to OT for evaluation for possible AFOs to reduce the risk of tripping and falling.

## 2023-12-11 NOTE — PROGRESS NOTES
"Physical Therapy    Physical Therapy Evaluation and Treatment      Patient Name: Rebecca Acharya  MRN: [id number]  Today's Date: 12/11/2023     Insurance  Payor: [employer]  Visit Number: 1 (updated 12/11/23)  Auth required      Assessment:  Patient presents to physical therapy with ***. Assessment revealed impairments with ***. These impairments have led to difficulty with *** (activities). As a result, participation in **** restrictions**** has been limited. Patient requires skilled physical therapy services to safely address previously stated impairments to return to PLOF with reduced pain.        Clinical presentation: stable and/or uncomplicated characteristics, evolving with changing characteristics, unstable and unpredictable characteristics ***  Level of complexity: low/moderate/high ***    Plan:       Current Problem:   No diagnosis found.    Subjective    Pt reports difficulty walking beginning 2 months ago. Pt has been tripping multiple times per day. This has resulted in *** # falls. Able/not able to get herself up. Falls have resulted in bumps and bruises but no major injuries. Pt does not feel off balance, attributes falls to decreased foot clearance. Hx of diabetic neuropathy (> or = in both feet). Factors that exacerbate gait instability: ***. Factors that assist with gait stability: ***. Pt stated goals: \"***\"    General:  Referred for: gait disturbance  Referred by: Marleen Burton MD     Precautions: Moderate fall risk. (+) neuropathy, DM, hypothyroidism, HTN. Patient denies hx of CA, pacemaker, blood thinners, latex allergy, epilepsy/seizures, or other known cardio/neuro/pulmonary problems       Medical History: reviewed (scanned into chart)    Vital Signs:     Pain:     Home Living:     Prior Level of Function:       Objective   General Assessments:  {General Assessments:15775}  Functional Assessments:  {Functional Assessments:70847}  Extremity/Trunk Assessments:  {Extremity/Trunk " Assessments:60730}  {Spine,UE,LE,HAND,LYMPHEDEMA:37727}    Outcome Measures:  {PT Outcome Measures:46090}     Treatments:  {PT Treatments:94022}    EDUCATION:       Goals:  STG (to be achieved in 3 weeks)  Pt will independently perform HEP as assigned to promote self-management of symptoms and continue making functional gains outside of physical therapy.  ***    LTG (to be achieved by discharge)  1) Pt will score > 22 points on FGA using LRAD to promote safe community ambulation and decrease fall risk.   2) Pt will improve *** DF strength by at least 1/5 MMT grade to assist with foot clearance during gait to assist with reducing falls.  3) Floor transfer?  4) TUG?  5) KHAN?  5) Modified CTSIB?

## 2023-12-12 ENCOUNTER — APPOINTMENT (OUTPATIENT)
Dept: PHYSICAL THERAPY | Facility: CLINIC | Age: 63
End: 2023-12-12
Payer: MEDICARE

## 2023-12-19 ENCOUNTER — APPOINTMENT (OUTPATIENT)
Dept: PRIMARY CARE | Facility: CLINIC | Age: 63
End: 2023-12-19
Payer: MEDICARE

## 2024-01-09 ENCOUNTER — OFFICE VISIT (OUTPATIENT)
Dept: PRIMARY CARE | Facility: CLINIC | Age: 64
End: 2024-01-09
Payer: MEDICARE

## 2024-01-09 ENCOUNTER — LAB (OUTPATIENT)
Dept: LAB | Facility: LAB | Age: 64
End: 2024-01-09
Payer: MEDICARE

## 2024-01-09 VITALS
WEIGHT: 279 LBS | OXYGEN SATURATION: 97 % | SYSTOLIC BLOOD PRESSURE: 135 MMHG | HEART RATE: 62 BPM | DIASTOLIC BLOOD PRESSURE: 80 MMHG | HEIGHT: 69 IN | BODY MASS INDEX: 41.32 KG/M2 | TEMPERATURE: 97.3 F

## 2024-01-09 DIAGNOSIS — E03.9 ACQUIRED HYPOTHYROIDISM: ICD-10-CM

## 2024-01-09 DIAGNOSIS — E66.01 CLASS 3 SEVERE OBESITY WITH SERIOUS COMORBIDITY AND BODY MASS INDEX (BMI) OF 40.0 TO 44.9 IN ADULT, UNSPECIFIED OBESITY TYPE (MULTI): ICD-10-CM

## 2024-01-09 DIAGNOSIS — E78.2 HYPERLIPEMIA, MIXED: ICD-10-CM

## 2024-01-09 DIAGNOSIS — E11.42 TYPE 2 DIABETES MELLITUS WITH DIABETIC POLYNEUROPATHY, WITHOUT LONG-TERM CURRENT USE OF INSULIN (MULTI): Primary | ICD-10-CM

## 2024-01-09 DIAGNOSIS — I10 BENIGN ESSENTIAL HYPERTENSION: ICD-10-CM

## 2024-01-09 LAB
ALBUMIN SERPL BCP-MCNC: 4.4 G/DL (ref 3.4–5)
ALP SERPL-CCNC: 87 U/L (ref 33–136)
ALT SERPL W P-5'-P-CCNC: 27 U/L (ref 7–45)
ANION GAP SERPL CALC-SCNC: 10 MMOL/L (ref 10–20)
AST SERPL W P-5'-P-CCNC: 22 U/L (ref 9–39)
BILIRUB SERPL-MCNC: 0.4 MG/DL (ref 0–1.2)
BUN SERPL-MCNC: 13 MG/DL (ref 6–23)
CALCIUM SERPL-MCNC: 10.2 MG/DL (ref 8.6–10.6)
CHLORIDE SERPL-SCNC: 101 MMOL/L (ref 98–107)
CHOLEST SERPL-MCNC: 138 MG/DL (ref 0–199)
CHOLESTEROL/HDL RATIO: 2.1
CO2 SERPL-SCNC: 34 MMOL/L (ref 21–32)
CREAT SERPL-MCNC: 0.79 MG/DL (ref 0.5–1.05)
EGFRCR SERPLBLD CKD-EPI 2021: 83 ML/MIN/1.73M*2
GLUCOSE SERPL-MCNC: 97 MG/DL (ref 74–99)
HDLC SERPL-MCNC: 65.7 MG/DL
LDLC SERPL CALC-MCNC: 60 MG/DL
NON HDL CHOLESTEROL: 72 MG/DL (ref 0–149)
POC HEMOGLOBIN A1C: 6.5 % (ref 4.2–6.5)
POTASSIUM SERPL-SCNC: 4.6 MMOL/L (ref 3.5–5.3)
PROT SERPL-MCNC: 7.4 G/DL (ref 6.4–8.2)
SODIUM SERPL-SCNC: 140 MMOL/L (ref 136–145)
TRIGL SERPL-MCNC: 60 MG/DL (ref 0–149)
TSH SERPL-ACNC: 2.05 MIU/L (ref 0.44–3.98)
VLDL: 12 MG/DL (ref 0–40)

## 2024-01-09 PROCEDURE — 36415 COLL VENOUS BLD VENIPUNCTURE: CPT

## 2024-01-09 PROCEDURE — 1159F MED LIST DOCD IN RCRD: CPT | Performed by: FAMILY MEDICINE

## 2024-01-09 PROCEDURE — 99214 OFFICE O/P EST MOD 30 MIN: CPT | Performed by: FAMILY MEDICINE

## 2024-01-09 PROCEDURE — 80061 LIPID PANEL: CPT

## 2024-01-09 PROCEDURE — 83036 HEMOGLOBIN GLYCOSYLATED A1C: CPT | Performed by: FAMILY MEDICINE

## 2024-01-09 PROCEDURE — 1036F TOBACCO NON-USER: CPT | Performed by: FAMILY MEDICINE

## 2024-01-09 PROCEDURE — 3079F DIAST BP 80-89 MM HG: CPT | Performed by: FAMILY MEDICINE

## 2024-01-09 PROCEDURE — 3075F SYST BP GE 130 - 139MM HG: CPT | Performed by: FAMILY MEDICINE

## 2024-01-09 PROCEDURE — 80053 COMPREHEN METABOLIC PANEL: CPT

## 2024-01-09 PROCEDURE — 3008F BODY MASS INDEX DOCD: CPT | Performed by: FAMILY MEDICINE

## 2024-01-09 PROCEDURE — 1160F RVW MEDS BY RX/DR IN RCRD: CPT | Performed by: FAMILY MEDICINE

## 2024-01-09 PROCEDURE — 84443 ASSAY THYROID STIM HORMONE: CPT

## 2024-01-09 ASSESSMENT — ENCOUNTER SYMPTOMS
FATIGUE: 0
UNEXPECTED WEIGHT CHANGE: 0
COUGH: 0
SHORTNESS OF BREATH: 0
ABDOMINAL PAIN: 0
PALPITATIONS: 0

## 2024-01-09 ASSESSMENT — PATIENT HEALTH QUESTIONNAIRE - PHQ9
2. FEELING DOWN, DEPRESSED OR HOPELESS: NOT AT ALL
SUM OF ALL RESPONSES TO PHQ9 QUESTIONS 1 AND 2: 0
1. LITTLE INTEREST OR PLEASURE IN DOING THINGS: NOT AT ALL

## 2024-01-09 ASSESSMENT — COLUMBIA-SUICIDE SEVERITY RATING SCALE - C-SSRS
2. HAVE YOU ACTUALLY HAD ANY THOUGHTS OF KILLING YOURSELF?: NO
1. IN THE PAST MONTH, HAVE YOU WISHED YOU WERE DEAD OR WISHED YOU COULD GO TO SLEEP AND NOT WAKE UP?: NO
6. HAVE YOU EVER DONE ANYTHING, STARTED TO DO ANYTHING, OR PREPARED TO DO ANYTHING TO END YOUR LIFE?: NO

## 2024-01-09 NOTE — PATIENT INSTRUCTIONS

## 2024-01-09 NOTE — PROGRESS NOTES
"Subjective   Patient ID: Rebecca Acharya is a 63 y.o. female who presents for Med Refill and Diabetes (Pt is here for med refills ).    Rebecca is here to follow up on her diabetes and blood pressure.  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  Her weight was stable over the holidays.  No problems with her medication.    Med Refill  Pertinent negatives include no abdominal pain, chest pain, coughing or fatigue.   Diabetes  Pertinent negatives for diabetes include no chest pain and no fatigue.       Review of Systems   Constitutional:  Negative for fatigue and unexpected weight change.   Respiratory:  Negative for cough and shortness of breath.    Cardiovascular:  Negative for chest pain and palpitations.   Gastrointestinal:  Negative for abdominal pain.       Objective     /80   Pulse 62   Temp 36.3 °C (97.3 °F)   Ht 1.74 m (5' 8.5\")   Wt 127 kg (279 lb)   SpO2 97%   BMI 41.80 kg/m²     Physical Exam  Constitutional:       General: She is not in acute distress.     Appearance: She is obese.   Neck:      Thyroid: No thyromegaly.   Cardiovascular:      Rate and Rhythm: Normal rate and regular rhythm.      Heart sounds: No murmur heard.  Pulmonary:      Effort: No respiratory distress.      Breath sounds: Normal breath sounds.   Lymphadenopathy:      Cervical: No cervical adenopathy.   Neurological:      Mental Status: She is alert.             Assessment/Plan   Problem List Items Addressed This Visit       Acquired hypothyroidism    Relevant Orders    TSH with reflex to Free T4 if abnormal    Benign essential hypertension    Current Assessment & Plan     Blood pressure is OK, will check labs and plan to continue the same medication.  F/U 6 months for recheck.         Hyperlipemia, mixed    Relevant Orders    Comprehensive Metabolic Panel    Lipid Panel    Type 2 diabetes mellitus with diabetic polyneuropathy, without long-term current use of insulin (CMS/[facility]) - Primary    Current Assessment & " Plan     Blood sugar is well controlled, will recheck in 6 months.         Relevant Orders    POCT glycosylated hemoglobin (Hb A1C) manually resulted (Completed)    Class 3 severe obesity with serious comorbidity and body mass index (BMI) of 40.0 to 44.9 in adult (CMS/[facility])      Patient was identified as a fall risk. Risk prevention instructions provided.

## 2024-01-09 NOTE — ASSESSMENT & PLAN NOTE
Blood pressure is OK, will check labs and plan to continue the same medication.  F/U 6 months for recheck.

## 2024-01-10 ENCOUNTER — TELEPHONE (OUTPATIENT)
Dept: PRIMARY CARE | Facility: CLINIC | Age: 64
End: 2024-01-10
Payer: MEDICARE

## 2024-01-10 DIAGNOSIS — E78.2 HYPERLIPEMIA, MIXED: ICD-10-CM

## 2024-01-10 DIAGNOSIS — E03.9 ACQUIRED HYPOTHYROIDISM: ICD-10-CM

## 2024-01-10 DIAGNOSIS — I10 BENIGN ESSENTIAL HYPERTENSION: ICD-10-CM

## 2024-01-10 RX ORDER — AMLODIPINE AND BENAZEPRIL HYDROCHLORIDE 10; 40 MG/1; MG/1
1 CAPSULE ORAL DAILY
Qty: 90 CAPSULE | Refills: 0 | Status: SHIPPED | OUTPATIENT
Start: 2024-01-10 | End: 2024-05-06 | Stop reason: SDUPTHER

## 2024-01-10 RX ORDER — ROSUVASTATIN CALCIUM 20 MG/1
20 TABLET, COATED ORAL DAILY
Qty: 30 TABLET | Refills: 0 | Status: SHIPPED | OUTPATIENT
Start: 2024-01-10 | End: 2024-01-19 | Stop reason: SDUPTHER

## 2024-01-10 RX ORDER — THYROID, PORCINE 15 MG/1
TABLET ORAL
Qty: 270 TABLET | Refills: 1 | Status: SHIPPED | OUTPATIENT
Start: 2024-01-10

## 2024-01-10 RX ORDER — METOPROLOL TARTRATE AND HYDROCHLOROTHIAZIDE 100; 25 MG/1; MG/1
1 TABLET ORAL DAILY
Qty: 30 TABLET | Refills: 0 | Status: SHIPPED | OUTPATIENT
Start: 2024-01-10 | End: 2024-01-19 | Stop reason: SDUPTHER

## 2024-01-10 NOTE — TELEPHONE ENCOUNTER
----- Message from Marleen Burton MD sent at 1/10/2024 12:25 PM EST -----  Labs reviewed, everything looks good.  Refills sent to mailorder pharmacy.    Called and spoke with tarik to give results above

## 2024-01-19 ENCOUNTER — TELEPHONE (OUTPATIENT)
Dept: PRIMARY CARE | Facility: CLINIC | Age: 64
End: 2024-01-19
Payer: MEDICARE

## 2024-01-19 DIAGNOSIS — I10 BENIGN ESSENTIAL HYPERTENSION: ICD-10-CM

## 2024-01-19 DIAGNOSIS — E78.2 HYPERLIPEMIA, MIXED: ICD-10-CM

## 2024-01-19 RX ORDER — ROSUVASTATIN CALCIUM 20 MG/1
20 TABLET, COATED ORAL DAILY
Qty: 90 TABLET | Refills: 1 | Status: SHIPPED | OUTPATIENT
Start: 2024-01-19 | End: 2024-07-17

## 2024-01-19 RX ORDER — METOPROLOL TARTRATE AND HYDROCHLOROTHIAZIDE 100; 25 MG/1; MG/1
1 TABLET ORAL DAILY
Qty: 90 TABLET | Refills: 1 | Status: SHIPPED | OUTPATIENT
Start: 2024-01-19 | End: 2024-07-17

## 2024-01-19 NOTE — TELEPHONE ENCOUNTER
Patient was in on 1.09.24. She was wondering why her Metoprolol Ta-Hydrochlorothiaz 100-25 MG and Rosuvastatin 20 MG were only renewed for 30 days instead of 90.

## 2024-05-06 ENCOUNTER — TELEPHONE (OUTPATIENT)
Dept: PRIMARY CARE | Facility: CLINIC | Age: 64
End: 2024-05-06
Payer: MEDICARE

## 2024-05-06 DIAGNOSIS — I10 BENIGN ESSENTIAL HYPERTENSION: ICD-10-CM

## 2024-05-06 RX ORDER — AMLODIPINE AND BENAZEPRIL HYDROCHLORIDE 10; 40 MG/1; MG/1
1 CAPSULE ORAL DAILY
Qty: 90 CAPSULE | Refills: 0 | Status: SHIPPED | OUTPATIENT
Start: 2024-05-06 | End: 2024-08-04

## 2024-05-06 NOTE — TELEPHONE ENCOUNTER
Pt said she only has a few days left and she needs to get this from mail service    Name:  Rebecca Acharya  :  [id number]  Medication Name:  Amlodipine-benazepril  Dose : 10-40 Mg  Route : Capsule  Frequency : 1 per day  Quantity : 90 Capsules  Directions : Take one capsule by mouth once daily  Specific Pharmacy location:  [facility]  Date of last appointment:  24  Date of next appointment:  24

## 2024-06-03 ENCOUNTER — TELEPHONE (OUTPATIENT)
Dept: PRIMARY CARE | Facility: CLINIC | Age: 64
End: 2024-06-03
Payer: MEDICARE

## 2024-06-04 ENCOUNTER — APPOINTMENT (OUTPATIENT)
Dept: PRIMARY CARE | Facility: CLINIC | Age: 64
End: 2024-06-04
Payer: MEDICARE

## 2024-07-23 ENCOUNTER — TELEPHONE (OUTPATIENT)
Dept: PRIMARY CARE | Facility: CLINIC | Age: 64
End: 2024-07-23

## 2024-07-23 ENCOUNTER — LAB (OUTPATIENT)
Dept: LAB | Facility: LAB | Age: 64
End: 2024-07-23
Payer: MEDICARE

## 2024-07-23 ENCOUNTER — APPOINTMENT (OUTPATIENT)
Dept: PRIMARY CARE | Facility: CLINIC | Age: 64
End: 2024-07-23
Payer: MEDICARE

## 2024-07-23 VITALS
DIASTOLIC BLOOD PRESSURE: 79 MMHG | HEART RATE: 60 BPM | WEIGHT: 281 LBS | SYSTOLIC BLOOD PRESSURE: 129 MMHG | TEMPERATURE: 97.5 F | HEIGHT: 69 IN | BODY MASS INDEX: 41.62 KG/M2 | OXYGEN SATURATION: 96 %

## 2024-07-23 DIAGNOSIS — E78.2 HYPERLIPEMIA, MIXED: ICD-10-CM

## 2024-07-23 DIAGNOSIS — J45.20 MILD INTERMITTENT ASTHMA WITHOUT COMPLICATION (HHS-HCC): ICD-10-CM

## 2024-07-23 DIAGNOSIS — E66.01 CLASS 3 SEVERE OBESITY WITH SERIOUS COMORBIDITY AND BODY MASS INDEX (BMI) OF 40.0 TO 44.9 IN ADULT, UNSPECIFIED OBESITY TYPE (MULTI): ICD-10-CM

## 2024-07-23 DIAGNOSIS — Z78.0 ASYMPTOMATIC MENOPAUSAL STATE: ICD-10-CM

## 2024-07-23 DIAGNOSIS — E55.9 VITAMIN D DEFICIENCY: ICD-10-CM

## 2024-07-23 DIAGNOSIS — I10 BENIGN ESSENTIAL HYPERTENSION: ICD-10-CM

## 2024-07-23 DIAGNOSIS — Z00.00 ROUTINE GENERAL MEDICAL EXAMINATION AT HEALTH CARE FACILITY: Primary | ICD-10-CM

## 2024-07-23 DIAGNOSIS — E11.42 TYPE 2 DIABETES MELLITUS WITH DIABETIC POLYNEUROPATHY, WITHOUT LONG-TERM CURRENT USE OF INSULIN (MULTI): Primary | ICD-10-CM

## 2024-07-23 DIAGNOSIS — E11.42 TYPE 2 DIABETES MELLITUS WITH DIABETIC POLYNEUROPATHY, WITHOUT LONG-TERM CURRENT USE OF INSULIN (MULTI): ICD-10-CM

## 2024-07-23 DIAGNOSIS — E03.9 ACQUIRED HYPOTHYROIDISM: ICD-10-CM

## 2024-07-23 DIAGNOSIS — Z12.31 ENCOUNTER FOR SCREENING MAMMOGRAM FOR BREAST CANCER: ICD-10-CM

## 2024-07-23 DIAGNOSIS — E11.42 TYPE 2 DIABETES MELLITUS WITH DIABETIC POLYNEUROPATHY (MULTI): Primary | ICD-10-CM

## 2024-07-23 DIAGNOSIS — Z23 NEED FOR VACCINATION: ICD-10-CM

## 2024-07-23 PROBLEM — R26.9 GAIT DISTURBANCE: Status: RESOLVED | Noted: 2023-11-15 | Resolved: 2024-07-23

## 2024-07-23 LAB
25(OH)D3 SERPL-MCNC: 43 NG/ML (ref 30–100)
ALBUMIN SERPL BCP-MCNC: 4.2 G/DL (ref 3.4–5)
ALP SERPL-CCNC: 93 U/L (ref 33–136)
ALT SERPL W P-5'-P-CCNC: 27 U/L (ref 7–45)
ANION GAP SERPL CALC-SCNC: 12 MMOL/L (ref 10–20)
AST SERPL W P-5'-P-CCNC: 23 U/L (ref 9–39)
BILIRUB SERPL-MCNC: 0.4 MG/DL (ref 0–1.2)
BUN SERPL-MCNC: 13 MG/DL (ref 6–23)
CALCIUM SERPL-MCNC: 9.7 MG/DL (ref 8.6–10.6)
CHLORIDE SERPL-SCNC: 98 MMOL/L (ref 98–107)
CHOLEST SERPL-MCNC: 140 MG/DL (ref 0–199)
CHOLESTEROL/HDL RATIO: 2.2
CO2 SERPL-SCNC: 32 MMOL/L (ref 21–32)
CREAT SERPL-MCNC: 0.83 MG/DL (ref 0.5–1.05)
CREAT UR-MCNC: 42.8 MG/DL (ref 20–320)
EGFRCR SERPLBLD CKD-EPI 2021: 78 ML/MIN/1.73M*2
GLUCOSE SERPL-MCNC: 106 MG/DL (ref 74–99)
HDLC SERPL-MCNC: 62.9 MG/DL
LDLC SERPL CALC-MCNC: 65 MG/DL
MICROALBUMIN UR-MCNC: <7 MG/L
MICROALBUMIN/CREAT UR: NORMAL MG/G{CREAT}
NON HDL CHOLESTEROL: 77 MG/DL (ref 0–149)
POC HEMOGLOBIN A1C: 6.5 % (ref 4.2–6.5)
POTASSIUM SERPL-SCNC: 4.6 MMOL/L (ref 3.5–5.3)
PROT SERPL-MCNC: 7.4 G/DL (ref 6.4–8.2)
SODIUM SERPL-SCNC: 137 MMOL/L (ref 136–145)
TRIGL SERPL-MCNC: 60 MG/DL (ref 0–149)
TSH SERPL-ACNC: 1.98 MIU/L (ref 0.44–3.98)
URATE SERPL-MCNC: 6.7 MG/DL (ref 2.3–6.7)
VLDL: 12 MG/DL (ref 0–40)

## 2024-07-23 PROCEDURE — 3008F BODY MASS INDEX DOCD: CPT | Performed by: FAMILY MEDICINE

## 2024-07-23 PROCEDURE — 82306 VITAMIN D 25 HYDROXY: CPT

## 2024-07-23 PROCEDURE — 82570 ASSAY OF URINE CREATININE: CPT

## 2024-07-23 PROCEDURE — 83695 ASSAY OF LIPOPROTEIN(A): CPT

## 2024-07-23 PROCEDURE — 80053 COMPREHEN METABOLIC PANEL: CPT

## 2024-07-23 PROCEDURE — 3048F LDL-C <100 MG/DL: CPT | Performed by: FAMILY MEDICINE

## 2024-07-23 PROCEDURE — 99214 OFFICE O/P EST MOD 30 MIN: CPT | Performed by: FAMILY MEDICINE

## 2024-07-23 PROCEDURE — 84550 ASSAY OF BLOOD/URIC ACID: CPT

## 2024-07-23 PROCEDURE — 3078F DIAST BP <80 MM HG: CPT | Performed by: FAMILY MEDICINE

## 2024-07-23 PROCEDURE — 1123F ACP DISCUSS/DSCN MKR DOCD: CPT | Performed by: FAMILY MEDICINE

## 2024-07-23 PROCEDURE — 1036F TOBACCO NON-USER: CPT | Performed by: FAMILY MEDICINE

## 2024-07-23 PROCEDURE — 1159F MED LIST DOCD IN RCRD: CPT | Performed by: FAMILY MEDICINE

## 2024-07-23 PROCEDURE — 83036 HEMOGLOBIN GLYCOSYLATED A1C: CPT | Performed by: FAMILY MEDICINE

## 2024-07-23 PROCEDURE — 84443 ASSAY THYROID STIM HORMONE: CPT

## 2024-07-23 PROCEDURE — 82043 UR ALBUMIN QUANTITATIVE: CPT

## 2024-07-23 PROCEDURE — 80061 LIPID PANEL: CPT

## 2024-07-23 PROCEDURE — 36415 COLL VENOUS BLD VENIPUNCTURE: CPT

## 2024-07-23 PROCEDURE — 1170F FXNL STATUS ASSESSED: CPT | Performed by: FAMILY MEDICINE

## 2024-07-23 PROCEDURE — 1158F ADVNC CARE PLAN TLK DOCD: CPT | Performed by: FAMILY MEDICINE

## 2024-07-23 PROCEDURE — 3074F SYST BP LT 130 MM HG: CPT | Performed by: FAMILY MEDICINE

## 2024-07-23 PROCEDURE — 1160F RVW MEDS BY RX/DR IN RCRD: CPT | Performed by: FAMILY MEDICINE

## 2024-07-23 PROCEDURE — G0438 PPPS, INITIAL VISIT: HCPCS | Performed by: FAMILY MEDICINE

## 2024-07-23 ASSESSMENT — ENCOUNTER SYMPTOMS
ARTHRALGIAS: 1
HEADACHES: 0
CONSTIPATION: 0
VOMITING: 0
DIZZINESS: 0
DIARRHEA: 0
NUMBNESS: 1
NAUSEA: 0
BACK PAIN: 1
FATIGUE: 0
COUGH: 0
DYSPHORIC MOOD: 0
RHINORRHEA: 0
LOSS OF SENSATION IN FEET: 1
DYSURIA: 0
FREQUENCY: 0
UNEXPECTED WEIGHT CHANGE: 1
SORE THROAT: 0
MYALGIAS: 0
BLOOD IN STOOL: 0
EYE PAIN: 0
SHORTNESS OF BREATH: 0
WEAKNESS: 0
DEPRESSION: 0
PALPITATIONS: 0
ABDOMINAL PAIN: 0
OCCASIONAL FEELINGS OF UNSTEADINESS: 0
NERVOUS/ANXIOUS: 0

## 2024-07-23 ASSESSMENT — PATIENT HEALTH QUESTIONNAIRE - PHQ9
1. LITTLE INTEREST OR PLEASURE IN DOING THINGS: NOT AT ALL
2. FEELING DOWN, DEPRESSED OR HOPELESS: NOT AT ALL
SUM OF ALL RESPONSES TO PHQ9 QUESTIONS 1 AND 2: 0

## 2024-07-23 ASSESSMENT — ACTIVITIES OF DAILY LIVING (ADL)
BATHING: INDEPENDENT
GROCERY_SHOPPING: INDEPENDENT
MANAGING_FINANCES: INDEPENDENT
DRESSING: INDEPENDENT
DOING_HOUSEWORK: INDEPENDENT
TAKING_MEDICATION: INDEPENDENT

## 2024-07-23 NOTE — TELEPHONE ENCOUNTER
Lab called saying the patient wasn't able to complete the Albumin-Creatinine, she was not able to provide a sample. They want you to put the order in again. Please advise.

## 2024-07-23 NOTE — PROGRESS NOTES
Subjective   Reason for Visit: Rebecca Acharya is an 63 y.o. female here for a Medicare Wellness visit.     Past Medical, Surgical, and Family History reviewed and updated in chart.    Reviewed all medications by prescribing practitioner or clinical pharmacist (such as prescriptions, OTCs, herbal therapies and supplements) and documented in the medical record.    Rebecca is here for her Medicare Wellness visit.  She also needs follow up on her diabetes, blood pressure, cholesterol and thyroid.  No problems with her medications.  She is not doing well with her diet, is eating the wrong things and eating late at night, not exercising.    Diet:  Has been off track  Exercise:  Bought a treadmill, but not using it   Sleep:  Not good, has always had problems  Stress:  Moderate  Smoking:  Never  EtOH:  None    Profession:  Retail    Dentist: Sees regularly  Eye doctor:  Sees annually    Last Pap:  N/A (hysterectomy)  History of abnormal Pap:  No  Mammogram:  Overdue  Colorectal cancer screening:  Colonoscopy 10/27/2022, repeat 5 years (history of polyps)  DEXA scan:  2019, normal, repeat 5 years  Vaccines due?  Needs Shingrix    Sexually active:  No    Menopause symptoms:  No hot flushes or night sweats  Urinary leakage: No        Patient Care Team:  Marleen Burton MD as PCP - General (Family Medicine)  Marleen Burton MD as PCP - [employer] PCP  Jm Obrien DPM as Surgeon (Podiatry)     Review of Systems   Constitutional:  Positive for unexpected weight change (up almost 10 pounds in the last year). Negative for fatigue.   HENT:  Negative for congestion, ear pain, rhinorrhea and sore throat.    Eyes:  Negative for pain and visual disturbance.   Respiratory:  Negative for cough and shortness of breath.    Cardiovascular:  Negative for chest pain and palpitations.   Gastrointestinal:  Negative for abdominal pain, blood in stool, constipation, diarrhea, nausea and vomiting.   Genitourinary:   "Negative for dysuria, frequency, vaginal bleeding and vaginal discharge.   Musculoskeletal:  Positive for arthralgias and back pain. Negative for myalgias.   Skin:  Negative for rash.   Neurological:  Positive for numbness (toes). Negative for dizziness, weakness and headaches.   Psychiatric/Behavioral:  Negative for dysphoric mood. The patient is not nervous/anxious.        Objective   Vitals:  /79   Pulse 60   Temp 36.4 °C (97.5 °F)   Ht 1.74 m (5' 8.5\")   Wt 127 kg (281 lb)   SpO2 96%   BMI 42.10 kg/m²       Physical Exam  Constitutional:       General: She is not in acute distress.     Appearance: She is obese.   HENT:      Right Ear: Tympanic membrane normal.      Left Ear: Tympanic membrane normal.   Neck:      Thyroid: No thyromegaly.   Cardiovascular:      Rate and Rhythm: Normal rate and regular rhythm.      Pulses:           Dorsalis pedis pulses are 1+ on the right side and 1+ on the left side.      Heart sounds: No murmur heard.  Pulmonary:      Effort: No respiratory distress.      Breath sounds: Normal breath sounds.   Chest:   Breasts:     Breasts are symmetrical.      Right: No mass, nipple discharge, skin change or tenderness.      Left: No mass, nipple discharge, skin change or tenderness.   Abdominal:      General: Bowel sounds are normal. There is no distension.      Palpations: Abdomen is soft. There is no hepatomegaly or splenomegaly.      Tenderness: There is no abdominal tenderness.   Musculoskeletal:         General: No swelling or tenderness.      Right foot: No deformity.      Left foot: No deformity.   Feet:      Right foot:      Protective Sensation: 6 sites tested.  2 sites sensed.      Skin integrity: Skin integrity normal.      Toenail Condition: Right toenails are normal.      Left foot:      Protective Sensation: 6 sites tested.  2 sites sensed.      Skin integrity: Skin integrity normal.      Toenail Condition: Left toenails are normal.   Lymphadenopathy:      Cervical: " No cervical adenopathy.      Upper Body:      Right upper body: No axillary adenopathy.      Left upper body: No axillary adenopathy.   Skin:     General: Skin is warm and dry.      Coloration: Skin is not jaundiced.      Findings: No rash.   Neurological:      General: No focal deficit present.      Mental Status: She is alert and oriented to person, place, and time.   Psychiatric:         Mood and Affect: Mood normal.         Behavior: Behavior normal.         Assessment/Plan   Problem List Items Addressed This Visit       Mild intermittent asthma without complication (HHS-HCC)    Current Assessment & Plan     Needs her inhaler rarely, under good control.         Acquired hypothyroidism    Relevant Orders    TSH with reflex to Free T4 if abnormal    Benign essential hypertension    Relevant Orders    Uric Acid    Hyperlipemia, mixed    Relevant Orders    Comprehensive Metabolic Panel    Lipid Panel    Apolipoprotein B    Type 2 diabetes mellitus with diabetic polyneuropathy, without long-term current use of insulin (Multi)    Current Assessment & Plan     Blood sugar is stable.  Discussed starting a GLP-1RA such as  Mounjaro to help with weight loss.  Discussed the mechanism of action and likely good and bad effects of the medication.  She will consider and let me know.           Relevant Orders    POCT glycosylated hemoglobin (Hb A1C) manually resulted (Completed)    Albumin-Creatinine Ratio, Urine Random    Vitamin D deficiency    Relevant Orders    Vitamin D 25-Hydroxy,Total (for eval of Vitamin D levels)    Class 3 severe obesity with serious comorbidity and body mass index (BMI) of 40.0 to 44.9 in adult (Multi)     Other Visit Diagnoses       Routine general medical examination at health care facility    -  Primary    Need for vaccination        Relevant Medications    zoster vaccine-recombinant adjuvanted (Shingrix) 50 mcg/0.5 mL vaccine    Asymptomatic menopausal state        Relevant Orders    XR DEXA bone  density    Encounter for screening mammogram for breast cancer        Relevant Orders    BI mammo bilateral screening tomosynthesis

## 2024-07-23 NOTE — ASSESSMENT & PLAN NOTE
Blood sugar is stable.  Discussed starting a GLP-1RA such as  Mounjaro to help with weight loss.  Discussed the mechanism of action and likely good and bad effects of the medication.  She will consider and let me know.

## 2024-07-23 NOTE — PATIENT INSTRUCTIONS
"Thank you for coming in to see me today, and congratulations on paying attention to staying healthy!    The single most important factor in staying healthy is eating a healthy diet.  Research has shown that the healthiest diet for humans is one rich in whole fresh plant foods.  This means most of what you eat should be fresh fruits and vegetables, whole grains, beans, nuts and seeds.  Adding meat, dairy foods and eggs does not make your food healthier (although it may make it taste better!) so you should work to minimize the amount of beef, chicken, pork, turkey, lamb, cheese and eggs you eat.  Fatty fish like salmon and tuna may be healthier alternatives.  If you would like more information please check out the website \"[employer],\" and the books \"The Blue Zones\" by Valentin Skinner and \"Engine 2 Diet\" by Edwin Hernandez.    I don't like recommending \"exercise\" because that has unpleasant connotations of pain and being out of breath for many people.  Instead, I encourage my patients to find a way to move your body that you LOVE and would want to do even if it were bad for you!  Playing a fun sport like pickleball, doing yoga or farooq chi, studying martial arts, learning to dance, even chasing your kids or grandkids around the backyard are great examples of activity that makes your heart healthier.  Remember, if you don't like it, don't do it!  There are plenty of fun options, pick one and try it out!  Aim for at least 30 minutes of activity that gets your heart revved up, most days per week.    We discussed some screening tests for you today, these tests are meant to find problems before they make you sick, when they are easier to treat and less likely to impact your health.  Depending on your age and stage of life they may include blood tests, a Pap test, a mammogram, a bone density test, a colonoscopy and others.  I can also order a test called Galleri, which is a blood test to screen for cancer.  It is not yet " covered by insurance and costs about $800, but I'm happy to order it if you would like.  If you have questions later about these or other recommended tests please call and ask!    There are a number of other things you can do to improve your health.  These may include things like   Increasing the amount of water you drink every day (aim for 1 oz of water for every 2 pounds of body weight, up to about 100 oz total)  Getting 7-8 hours of sleep every night  Avoiding smoking, drinking alcohol, and using recreational drugs    Stress management is also a very important component of staying healthy.  One of the most effective stress management tools is meditation.  I recommend everyone consider proper training in meditation - it isn't just sitting with your eyes closed and breathing.  You can find a training program in your area at [employer] or [email or web address].    An annual physical is a great measure to keep you as healthy as you possibly can be.  Good for you for getting that done!  See you next year :-)     Current weight: 127 kg (281 lb)  Weight change since last visit (-) denotes wt loss 2 lbs   Weight loss needed to achieve BMI 25: 114.5 Lbs  Weight loss needed to achieve BMI 30: 81.2 Lbs

## 2024-07-26 LAB — APO B100 SERPL-MCNC: 67 MG/DL (ref 60–117)

## 2024-07-29 ENCOUNTER — TELEPHONE (OUTPATIENT)
Dept: PRIMARY CARE | Facility: CLINIC | Age: 64
End: 2024-07-29
Payer: MEDICARE

## 2024-07-29 DIAGNOSIS — E78.2 HYPERLIPEMIA, MIXED: ICD-10-CM

## 2024-07-29 DIAGNOSIS — I10 BENIGN ESSENTIAL HYPERTENSION: ICD-10-CM

## 2024-07-29 DIAGNOSIS — E03.9 ACQUIRED HYPOTHYROIDISM: ICD-10-CM

## 2024-07-29 RX ORDER — METOPROLOL SUCCINATE 100 MG/1
100 TABLET, EXTENDED RELEASE ORAL DAILY
Qty: 90 TABLET | Refills: 0 | Status: SHIPPED | OUTPATIENT
Start: 2024-07-29 | End: 2024-10-27

## 2024-07-29 RX ORDER — THYROID, PORCINE 15 MG/1
TABLET ORAL
Qty: 270 TABLET | Refills: 1 | Status: SHIPPED | OUTPATIENT
Start: 2024-07-29

## 2024-07-29 RX ORDER — AMLODIPINE AND BENAZEPRIL HYDROCHLORIDE 10; 40 MG/1; MG/1
1 CAPSULE ORAL DAILY
Qty: 90 CAPSULE | Refills: 1 | Status: SHIPPED | OUTPATIENT
Start: 2024-07-29 | End: 2025-01-25

## 2024-07-29 RX ORDER — ROSUVASTATIN CALCIUM 20 MG/1
20 TABLET, COATED ORAL DAILY
Qty: 90 TABLET | Refills: 1 | Status: SHIPPED | OUTPATIENT
Start: 2024-07-29 | End: 2025-01-25

## 2024-07-29 NOTE — TELEPHONE ENCOUNTER
Rx Refill Request Telephone Encounter    Name:  Rebecca Acharya  :  [id number]  Medication Name:  AMLODIPINE    10-40 MG       90 CAPSULE    Specific Pharmacy location:  [facility]  Date of last appointment:  2024  Date of next appointment:  2025  Best number to reach patient:

## 2024-08-10 ENCOUNTER — HOSPITAL ENCOUNTER (OUTPATIENT)
Dept: RADIOLOGY | Facility: CLINIC | Age: 64
Discharge: HOME | End: 2024-08-10
Payer: MEDICARE

## 2024-08-10 VITALS — HEIGHT: 68 IN | BODY MASS INDEX: 42.59 KG/M2 | WEIGHT: 281 LBS

## 2024-08-10 DIAGNOSIS — Z78.0 ASYMPTOMATIC MENOPAUSAL STATE: ICD-10-CM

## 2024-08-10 DIAGNOSIS — Z12.31 ENCOUNTER FOR SCREENING MAMMOGRAM FOR BREAST CANCER: ICD-10-CM

## 2024-08-10 PROCEDURE — 77067 SCR MAMMO BI INCL CAD: CPT

## 2024-08-10 PROCEDURE — 77063 BREAST TOMOSYNTHESIS BI: CPT | Performed by: RADIOLOGY

## 2024-08-10 PROCEDURE — 77067 SCR MAMMO BI INCL CAD: CPT | Performed by: RADIOLOGY

## 2024-08-10 PROCEDURE — 77080 DXA BONE DENSITY AXIAL: CPT

## 2024-08-10 ASSESSMENT — LIFESTYLE VARIABLES
3_OR_MORE_DRINKS_PER_DAY: N
CURRENT_SMOKER: N

## 2024-08-13 DIAGNOSIS — R92.8 ABNORMAL MAMMOGRAM OF RIGHT BREAST: Primary | ICD-10-CM

## 2024-08-14 ENCOUNTER — TELEPHONE (OUTPATIENT)
Dept: PRIMARY CARE | Facility: CLINIC | Age: 64
End: 2024-08-14
Payer: MEDICARE

## 2024-08-14 NOTE — TELEPHONE ENCOUNTER
----- Message from Marleen Burton sent at 8/13/2024  5:09 PM EDT -----  Left message for patient to call for results.  Her mammogram showed an asymmetry on the right side and the radiologist would like some additional views and possibly an ultrasound.  Orders entered, she will need to call [phone] to make the appointment.

## 2024-08-14 NOTE — TELEPHONE ENCOUNTER
Called pt regarding results and to notify about orders. Pt understood results and is going to call and schedule.

## 2024-08-20 ENCOUNTER — HOSPITAL ENCOUNTER (OUTPATIENT)
Dept: RADIOLOGY | Facility: HOSPITAL | Age: 64
Discharge: HOME | End: 2024-08-20
Payer: MEDICARE

## 2024-08-20 DIAGNOSIS — R92.8 ABNORMAL MAMMOGRAM OF RIGHT BREAST: ICD-10-CM

## 2024-08-20 PROCEDURE — 77061 BREAST TOMOSYNTHESIS UNI: CPT | Mod: RT

## 2024-08-20 PROCEDURE — 76981 USE PARENCHYMA: CPT | Mod: RT

## 2024-08-20 PROCEDURE — 77065 DX MAMMO INCL CAD UNI: CPT | Mod: RIGHT SIDE | Performed by: STUDENT IN AN ORGANIZED HEALTH CARE EDUCATION/TRAINING PROGRAM

## 2024-08-20 PROCEDURE — 76642 ULTRASOUND BREAST LIMITED: CPT | Mod: RIGHT SIDE | Performed by: STUDENT IN AN ORGANIZED HEALTH CARE EDUCATION/TRAINING PROGRAM

## 2024-08-20 PROCEDURE — G0279 TOMOSYNTHESIS, MAMMO: HCPCS | Mod: RIGHT SIDE | Performed by: STUDENT IN AN ORGANIZED HEALTH CARE EDUCATION/TRAINING PROGRAM

## 2024-08-20 PROCEDURE — 76642 ULTRASOUND BREAST LIMITED: CPT | Mod: RT

## 2024-08-21 ENCOUNTER — TELEPHONE (OUTPATIENT)
Dept: PRIMARY CARE | Facility: CLINIC | Age: 64
End: 2024-08-21
Payer: MEDICARE

## 2024-08-21 NOTE — TELEPHONE ENCOUNTER
----- Message from Marleen Burton sent at 8/20/2024  5:12 PM EDT -----  Mammogram reviewed, radiologist feels the changes were due to some old scarring, no evidence of breast cancer.  We'll go back to routine annual screening.

## 2024-08-26 ENCOUNTER — TELEPHONE (OUTPATIENT)
Dept: PRIMARY CARE | Facility: CLINIC | Age: 64
End: 2024-08-26
Payer: MEDICARE

## 2024-08-26 DIAGNOSIS — I10 BENIGN ESSENTIAL HYPERTENSION: ICD-10-CM

## 2024-08-26 RX ORDER — METOPROLOL SUCCINATE 100 MG/1
100 TABLET, EXTENDED RELEASE ORAL DAILY
Qty: 90 TABLET | Refills: 1 | Status: SHIPPED | OUTPATIENT
Start: 2024-08-26 | End: 2025-02-22

## 2024-08-26 NOTE — TELEPHONE ENCOUNTER
Patient called saying she never got her metoprolol succinate XL Rx from the [facility]. She wants to know if you would be willing to send it to the [facility] on file. Please advise.

## 2025-01-21 ENCOUNTER — APPOINTMENT (OUTPATIENT)
Dept: PRIMARY CARE | Facility: CLINIC | Age: 65
End: 2025-01-21
Payer: MEDICARE

## 2025-05-20 ENCOUNTER — APPOINTMENT (OUTPATIENT)
Dept: OPHTHALMOLOGY | Facility: CLINIC | Age: 65
End: 2025-05-20
Payer: MEDICARE

## 2025-05-20 DIAGNOSIS — H52.4 PRESBYOPIA: ICD-10-CM

## 2025-05-20 DIAGNOSIS — H35.373 EPIRETINAL MEMBRANE (ERM) OF BOTH EYES: ICD-10-CM

## 2025-05-20 DIAGNOSIS — Z96.1 PSEUDOPHAKIA: ICD-10-CM

## 2025-05-20 DIAGNOSIS — E11.9 TYPE 2 DIABETES MELLITUS WITHOUT COMPLICATION, WITHOUT LONG-TERM CURRENT USE OF INSULIN: Primary | ICD-10-CM

## 2025-05-20 DIAGNOSIS — H26.491 PCO (POSTERIOR CAPSULAR OPACIFICATION), RIGHT: ICD-10-CM

## 2025-05-20 PROBLEM — E78.00 PURE HYPERCHOLESTEROLEMIA: Status: ACTIVE | Noted: 2025-05-20

## 2025-05-20 PROBLEM — I10 HYPERTENSION: Status: ACTIVE | Noted: 2025-05-06

## 2025-05-20 PROCEDURE — 92134 CPTRZ OPH DX IMG PST SGM RTA: CPT | Performed by: OPTOMETRIST

## 2025-05-20 PROCEDURE — 2023F DILAT RTA XM W/O RTNOPTHY: CPT | Performed by: OPTOMETRIST

## 2025-05-20 PROCEDURE — 99214 OFFICE O/P EST MOD 30 MIN: CPT | Performed by: OPTOMETRIST

## 2025-05-20 ASSESSMENT — CONF VISUAL FIELD
OS_SUPERIOR_NASAL_RESTRICTION: 0
METHOD: COUNTING FINGERS
OD_NORMAL: 1
OS_INFERIOR_NASAL_RESTRICTION: 0
OD_INFERIOR_TEMPORAL_RESTRICTION: 0
OS_NORMAL: 1
OD_INFERIOR_NASAL_RESTRICTION: 0
OD_SUPERIOR_TEMPORAL_RESTRICTION: 0
OS_INFERIOR_TEMPORAL_RESTRICTION: 0
OS_SUPERIOR_TEMPORAL_RESTRICTION: 0
OD_SUPERIOR_NASAL_RESTRICTION: 0

## 2025-05-20 ASSESSMENT — REFRACTION_MANIFEST
OD_ADD: +2.25
OD_AXIS: 091
OD_SPHERE: -4.25
OS_AXIS: 085
OS_ADD: +2.25
OS_SPHERE: -3.50
OS_CYLINDER: +1.25
OD_CYLINDER: +2.25

## 2025-05-20 ASSESSMENT — ENCOUNTER SYMPTOMS
CARDIOVASCULAR NEGATIVE: 0
NEUROLOGICAL NEGATIVE: 0
ENDOCRINE NEGATIVE: 0
ALLERGIC/IMMUNOLOGIC NEGATIVE: 0
PSYCHIATRIC NEGATIVE: 0
MUSCULOSKELETAL NEGATIVE: 0
EYES NEGATIVE: 1
CONSTITUTIONAL NEGATIVE: 0
GASTROINTESTINAL NEGATIVE: 0
HEMATOLOGIC/LYMPHATIC NEGATIVE: 0
RESPIRATORY NEGATIVE: 0

## 2025-05-20 ASSESSMENT — VISUAL ACUITY
OS_CC: 20/20
METHOD: SNELLEN - LINEAR
OD_CC: 20/25
CORRECTION_TYPE: GLASSES
OD_CC+: -1

## 2025-05-20 ASSESSMENT — REFRACTION_WEARINGRX
OD_AXIS: 028
OS_CYLINDER: SPHER
OS_SPHERE: -2.25
OD_CYLINDER: -0.50
OD_SPHERE: -2.50

## 2025-05-20 ASSESSMENT — CUP TO DISC RATIO: OS_RATIO: .25

## 2025-05-20 ASSESSMENT — EXTERNAL EXAM - LEFT EYE: OS_EXAM: NORMAL

## 2025-05-20 ASSESSMENT — TONOMETRY
IOP_METHOD: GOLDMANN APPLANATION
OD_IOP_MMHG: 18
OS_IOP_MMHG: 16-17

## 2025-05-20 ASSESSMENT — SLIT LAMP EXAM - LIDS
COMMENTS: GOOD POSITION, TR MGD
COMMENTS: GOOD POSITION, TR MGD

## 2025-05-20 ASSESSMENT — EXTERNAL EXAM - RIGHT EYE: OD_EXAM: NORMAL

## 2025-05-20 NOTE — PROGRESS NOTES
Assessment/Plan   Diagnoses and all orders for this visit:  Type 2 diabetes mellitus without complication, without long-term current use of insulin  The patient has diabetes without any evidence of retinopathy.  The patient was advised to maintain tight glucose control, tight blood pressure control, and favorable levels of cholesterol, low density lipoprotein, and high density lipoproteins.  Follow up in one year was recommended.    Epiretinal membrane (ERM) of both eyes  -     OCT, Retina - OU - Both Eyes  Discussed findings and etiology. ERM not visually significant at this time. Stable x 3 years. RTC 1 year for OCT Macula or sooner with changes on grid.      Pseudophakia  PCO (posterior capsular opacification), right  Patient symptomatic for blurred vision, glare d/t PCO. Recommend eval for YAG Caps. Patient agrees.     Presbyopia  Hold on refractive assessment unti pedro luis YAG caps OD. Medical billed today, will use eymed at next.

## 2025-07-14 ENCOUNTER — APPOINTMENT (OUTPATIENT)
Dept: OPHTHALMOLOGY | Facility: CLINIC | Age: 65
End: 2025-07-14
Payer: MEDICARE

## 2025-07-14 DIAGNOSIS — H26.491 PCO (POSTERIOR CAPSULAR OPACIFICATION), RIGHT: Primary | ICD-10-CM

## 2025-07-14 PROCEDURE — 66821 AFTER CATARACT LASER SURGERY: CPT | Mod: RIGHT SIDE | Performed by: OPHTHALMOLOGY

## 2025-07-14 ASSESSMENT — ENCOUNTER SYMPTOMS
NEUROLOGICAL NEGATIVE: 0
CONSTITUTIONAL NEGATIVE: 0
MUSCULOSKELETAL NEGATIVE: 0
CARDIOVASCULAR NEGATIVE: 0
GASTROINTESTINAL NEGATIVE: 0
ENDOCRINE NEGATIVE: 0
RESPIRATORY NEGATIVE: 0
HEMATOLOGIC/LYMPHATIC NEGATIVE: 0
ALLERGIC/IMMUNOLOGIC NEGATIVE: 0
EYES NEGATIVE: 1
PSYCHIATRIC NEGATIVE: 0

## 2025-07-14 ASSESSMENT — CONF VISUAL FIELD
METHOD: COUNTING FINGERS
OS_NORMAL: 1
OS_INFERIOR_TEMPORAL_RESTRICTION: 0
OD_NORMAL: 1
OD_INFERIOR_TEMPORAL_RESTRICTION: 0
OD_SUPERIOR_TEMPORAL_RESTRICTION: 0
OS_INFERIOR_NASAL_RESTRICTION: 0
OS_SUPERIOR_TEMPORAL_RESTRICTION: 0
OS_SUPERIOR_NASAL_RESTRICTION: 0
OD_INFERIOR_NASAL_RESTRICTION: 0
OD_SUPERIOR_NASAL_RESTRICTION: 0

## 2025-07-14 ASSESSMENT — VISUAL ACUITY
METHOD: SNELLEN - LINEAR
OS_CC: 20/20
OD_CC+: -1
OS_CC+: -1
CORRECTION_TYPE: GLASSES
OD_CC: 20/30

## 2025-07-14 ASSESSMENT — TONOMETRY
IOP_METHOD: GOLDMANN APPLANATION
OS_IOP_MMHG: 18
OD_IOP_MMHG: 17

## 2025-07-14 ASSESSMENT — SLIT LAMP EXAM - LIDS
COMMENTS: GOOD POSITION, TR MGD
COMMENTS: GOOD POSITION, TR MGD

## 2025-07-14 ASSESSMENT — EXTERNAL EXAM - RIGHT EYE: OD_EXAM: NORMAL

## 2025-07-14 ASSESSMENT — REFRACTION_WEARINGRX
OS_SPHERE: -2.25
OD_AXIS: 028
OS_CYLINDER: SPHER
OD_CYLINDER: -0.50
OD_SPHERE: -2.50

## 2025-07-14 ASSESSMENT — EXTERNAL EXAM - LEFT EYE: OS_EXAM: NORMAL

## 2025-07-14 NOTE — PROGRESS NOTES
Assessment/Plan   Diagnoses and all orders for this visit:  PCO (posterior capsular opacification), right  PCO visually significant and interfers with pt daily activities such as driving and working on a computer  Offered YAG OD. Explained r/b/a of procedure. Patient would like to proceed.   -     YAG Capsulotomy - OD - Right Eye

## 2025-07-18 ENCOUNTER — HOSPITAL ENCOUNTER (OUTPATIENT)
Dept: RADIOLOGY | Facility: HOSPITAL | Age: 65
Discharge: HOME | End: 2025-07-18
Payer: MEDICARE

## 2025-07-18 DIAGNOSIS — R05.3 CHRONIC COUGH: ICD-10-CM

## 2025-07-18 PROCEDURE — 71046 X-RAY EXAM CHEST 2 VIEWS: CPT

## 2025-07-22 ENCOUNTER — APPOINTMENT (OUTPATIENT)
Dept: PRIMARY CARE | Facility: CLINIC | Age: 65
End: 2025-07-22
Payer: MEDICARE

## 2025-08-11 ENCOUNTER — HOSPITAL ENCOUNTER (OUTPATIENT)
Dept: RADIOLOGY | Facility: CLINIC | Age: 65
Discharge: HOME | End: 2025-08-11
Payer: MEDICARE

## 2025-08-11 VITALS — BODY MASS INDEX: 44.41 KG/M2 | WEIGHT: 293 LBS | HEIGHT: 68 IN

## 2025-08-11 DIAGNOSIS — Z12.31 ENCOUNTER FOR SCREENING MAMMOGRAM FOR MALIGNANT NEOPLASM OF BREAST: ICD-10-CM

## 2025-08-11 PROCEDURE — 77067 SCR MAMMO BI INCL CAD: CPT

## 2025-08-11 PROCEDURE — 77067 SCR MAMMO BI INCL CAD: CPT | Performed by: RADIOLOGY

## 2025-08-11 PROCEDURE — 77063 BREAST TOMOSYNTHESIS BI: CPT | Performed by: RADIOLOGY

## 2025-08-28 ENCOUNTER — ANCILLARY PROCEDURE (OUTPATIENT)
Dept: URGENT CARE | Age: 65
End: 2025-08-28
Payer: MEDICARE

## 2025-08-28 ENCOUNTER — OFFICE VISIT (OUTPATIENT)
Dept: URGENT CARE | Age: 65
End: 2025-08-28
Payer: MEDICARE

## 2025-08-28 VITALS
RESPIRATION RATE: 17 BRPM | SYSTOLIC BLOOD PRESSURE: 156 MMHG | TEMPERATURE: 98.2 F | HEART RATE: 88 BPM | WEIGHT: 293 LBS | OXYGEN SATURATION: 98 % | DIASTOLIC BLOOD PRESSURE: 81 MMHG | HEIGHT: 69 IN | BODY MASS INDEX: 43.4 KG/M2

## 2025-08-28 DIAGNOSIS — S93.601A SPRAIN OF RIGHT FOOT, INITIAL ENCOUNTER: Primary | ICD-10-CM

## 2025-08-28 DIAGNOSIS — S99.921A INJURY OF RIGHT FOOT, INITIAL ENCOUNTER: ICD-10-CM

## 2025-08-28 RX ORDER — HYDROCHLOROTHIAZIDE 12.5 MG/1
12.5 CAPSULE ORAL
COMMUNITY
Start: 2025-05-14

## 2025-08-28 RX ORDER — LOSARTAN POTASSIUM 100 MG/1
TABLET ORAL
COMMUNITY
Start: 2025-06-30

## 2025-08-28 RX ORDER — AMLODIPINE BESYLATE 10 MG/1
TABLET ORAL
COMMUNITY
Start: 2025-06-30

## 2025-08-28 RX ORDER — IBUPROFEN 800 MG/1
800 TABLET, FILM COATED ORAL EVERY 8 HOURS PRN
Qty: 21 TABLET | Refills: 0 | Status: SHIPPED | OUTPATIENT
Start: 2025-08-28 | End: 2025-09-04
